# Patient Record
Sex: FEMALE | Race: WHITE | Employment: OTHER | URBAN - METROPOLITAN AREA
[De-identification: names, ages, dates, MRNs, and addresses within clinical notes are randomized per-mention and may not be internally consistent; named-entity substitution may affect disease eponyms.]

---

## 2017-03-27 ENCOUNTER — NURSE NAVIGATOR (OUTPATIENT)
Dept: FAMILY MEDICINE CLINIC | Age: 77
End: 2017-03-27

## 2017-03-27 NOTE — PROGRESS NOTES
Attempted to contact patient in efforts to schedule a Medicare Wellness appointment. Unable to reach patient by phone, However I was able to leave a voice message advising to call the office.

## 2022-08-16 ENCOUNTER — LAB ONLY (OUTPATIENT)
Dept: FAMILY MEDICINE CLINIC | Age: 82
End: 2022-08-16

## 2022-08-16 ENCOUNTER — OFFICE VISIT (OUTPATIENT)
Dept: FAMILY MEDICINE CLINIC | Age: 82
End: 2022-08-16
Payer: COMMERCIAL

## 2022-08-16 VITALS
OXYGEN SATURATION: 98 % | HEIGHT: 61 IN | BODY MASS INDEX: 18.31 KG/M2 | WEIGHT: 97 LBS | RESPIRATION RATE: 16 BRPM | TEMPERATURE: 97.8 F | SYSTOLIC BLOOD PRESSURE: 146 MMHG | DIASTOLIC BLOOD PRESSURE: 80 MMHG | HEART RATE: 85 BPM

## 2022-08-16 DIAGNOSIS — M19.90 INFLAMMATORY ARTHRITIS: ICD-10-CM

## 2022-08-16 DIAGNOSIS — M62.81 MUSCLE WEAKNESS: ICD-10-CM

## 2022-08-16 DIAGNOSIS — I95.1 POSTURAL HYPOTENSION: ICD-10-CM

## 2022-08-16 DIAGNOSIS — M62.84 SARCOPENIA: ICD-10-CM

## 2022-08-16 DIAGNOSIS — M25.512 PAIN OF BOTH SHOULDER JOINTS: Primary | ICD-10-CM

## 2022-08-16 DIAGNOSIS — M25.511 PAIN OF BOTH SHOULDER JOINTS: Primary | ICD-10-CM

## 2022-08-16 PROCEDURE — 1123F ACP DISCUSS/DSCN MKR DOCD: CPT | Performed by: FAMILY MEDICINE

## 2022-08-16 PROCEDURE — 99203 OFFICE O/P NEW LOW 30 MIN: CPT | Performed by: FAMILY MEDICINE

## 2022-08-16 RX ORDER — AMLODIPINE BESYLATE 2.5 MG/1
TABLET ORAL
COMMUNITY
Start: 2022-07-26 | End: 2022-09-08 | Stop reason: SDUPTHER

## 2022-08-16 NOTE — PROGRESS NOTES
Identified pt with two pt identifiers(name and ). Chief Complaint   Patient presents with    New Patient        Health Maintenance Due   Topic    Depression Screen     COVID-19 Vaccine (1)    Shingrix Vaccine Age 50> (1 of 2)    Bone Densitometry (Dexa) Screening     Pneumococcal 65+ years (1 - PCV)    Medicare Yearly Exam        Wt Readings from Last 3 Encounters:   22 97 lb (44 kg)   10/18/16 97 lb (44 kg)   16 97 lb (44 kg)     Temp Readings from Last 3 Encounters:   22 97.8 °F (36.6 °C) (Temporal)   10/18/16 98 °F (36.7 °C) (Oral)   09/15/16 96.6 °F (35.9 °C) (Oral)     BP Readings from Last 3 Encounters:   22 (!) 146/80   10/18/16 152/76   09/15/16 162/81     Pulse Readings from Last 3 Encounters:   22 85   10/18/16 70   09/15/16 65         Learning Assessment:  :     No flowsheet data found. Depression Screening:  :     3 most recent PHQ Screens 2022   Little interest or pleasure in doing things Not at all   Feeling down, depressed, irritable, or hopeless Not at all   Total Score PHQ 2 0       Fall Risk Assessment:  :     No flowsheet data found. Abuse Screening:  :     Abuse Screening Questionnaire 2022   Do you ever feel afraid of your partner? N   Are you in a relationship with someone who physically or mentally threatens you? N   Is it safe for you to go home? Y       Coordination of Care Questionnaire:  :     1) Have you been to an emergency room, urgent care clinic since your last visit? yes [facility] for HTN  Hospitalized since your last visit? no             2) Have you seen or consulted any other health care providers outside of [address] [place] [address] since your last visit? no  (Include any pap smears or colon screenings in this section.)    3) Do you have an Advance Directive on file?  yes  Are you interested in receiving information about Advance Directives? no    Patient is accompanied by  I have received verbal consent from Delilah Francisco to discuss any/all medical information while they are present in the room. 4.  For patients aged 39-70: Has the patient had a colonoscopy / FIT/ Cologuard? NA - based on age      If the patient is female:    11. For patients aged 41-77: Has the patient had a mammogram within the past 2 years? NA - based on age or sex      10. For patients aged 21-65: Has the patient had a pap smear?  NA - based on age or sex

## 2022-08-17 LAB
ALBUMIN SERPL-MCNC: 3.8 G/DL (ref 3.5–5)
ALBUMIN/GLOB SERPL: 1.1 {RATIO} (ref 1.1–2.2)
ALP SERPL-CCNC: 93 U/L (ref 45–117)
ALT SERPL-CCNC: 18 U/L (ref 12–78)
ANION GAP SERPL CALC-SCNC: 8 MMOL/L (ref 5–15)
AST SERPL-CCNC: 21 U/L (ref 15–37)
BILIRUB SERPL-MCNC: 0.3 MG/DL (ref 0.2–1)
BUN SERPL-MCNC: 25 MG/DL (ref 6–20)
BUN/CREAT SERPL: 28 (ref 12–20)
CALCIUM SERPL-MCNC: 9.7 MG/DL (ref 8.5–10.1)
CHLORIDE SERPL-SCNC: 102 MMOL/L (ref 97–108)
CK SERPL-CCNC: 63 U/L (ref 26–192)
CO2 SERPL-SCNC: 31 MMOL/L (ref 21–32)
CREAT SERPL-MCNC: 0.88 MG/DL (ref 0.55–1.02)
ERYTHROCYTE [SEDIMENTATION RATE] IN BLOOD: 44 MM/HR (ref 0–30)
GLOBULIN SER CALC-MCNC: 3.4 G/DL (ref 2–4)
GLUCOSE SERPL-MCNC: 94 MG/DL (ref 65–100)
POTASSIUM SERPL-SCNC: 3.8 MMOL/L (ref 3.5–5.1)
PROT SERPL-MCNC: 7.2 G/DL (ref 6.4–8.2)
SODIUM SERPL-SCNC: 141 MMOL/L (ref 136–145)

## 2022-08-18 RX ORDER — PREDNISONE 10 MG/1
10 TABLET ORAL SEE ADMIN INSTRUCTIONS
Qty: 21 TABLET | Refills: 0 | Status: SHIPPED | OUTPATIENT
Start: 2022-08-18 | End: 2022-10-25

## 2022-08-18 NOTE — PROGRESS NOTES
Eliceo Reeves (: 1940) is a 81 y.o. female, new patient, here for evaluation of the following chief complaint(s):  New Patient and Referral Request (Pt wants a rheumatologist referral/ )       ASSESSMENT/PLAN:  Below is the assessment and plan developed based on review of pertinent history, physical exam, labs, studies, and medications. 1. Pain of both shoulder joints  2. Muscle weakness  -     CK; Future  -     METABOLIC PANEL, COMPREHENSIVE; Future  -     SED RATE (ESR); Future  3. Sarcopenia  -     CK; Future  -     SED RATE (ESR); Future  4. Postural hypotension  5. Inflammatory arthritis  -     SED RATE (ESR); Future  -     predniSONE (STERAPRED DS) 10 mg dose pack; Take 1 Tablet by mouth See Admin Instructions. See administration instruction per 10mg dose pack, Normal, Disp-21 Tablet, R-0      No follow-ups on file. Eliceo Reeves is a 81 y.o. female is an elderly female who presents for ER follow up and to establish care. Patient had fallen, was found to abril having orthostatic hypotension, labs and EKG were normal. Patient also presents for bilateral shoulder pain, with muscle weakness. Advised  to monitor the patients blood pressure. Since it is on the lower side, they should stop using amlodipine. Additionally, the patient should start taking prednisone dosage pack, for shoulder pain and increased ESR. SUBJECTIVE/OBJECTIVE:  Eliceo Reeves is a 81 y.o. female was seen in the hospital for falling.  states that she got up and slowly fell straight down. Had gone to the ER, EKG, blood work all came back normal. Takes amlodipine 2.5 mg daily for blood pressure. Has expressive aphasia and has been diagnosed with dementia in the past.  states that patient also has swelling of the fingers at times, with upper arm and should pain. States that she feels very weak, and complains a lot about shoulder joint and muscle pain.        Review of Systems   Constitutional:  Positive for fatigue. Negative for activity change, appetite change and fever. HENT: Negative. Eyes: Negative. Respiratory: Negative. Endocrine: Negative. Genitourinary: Negative. Musculoskeletal:  Positive for arthralgias and myalgias. Negative for back pain, gait problem, joint swelling, neck pain and neck stiffness. Skin: Negative. Allergic/Immunologic: Negative. Neurological: Negative. Hematological: Negative. Psychiatric/Behavioral:  Positive for confusion and decreased concentration. Negative for sleep disturbance. The patient is not nervous/anxious. Physical Exam  Constitutional:       Appearance: Normal appearance. She is normal weight. HENT:      Head: Normocephalic and atraumatic. Right Ear: Tympanic membrane, ear canal and external ear normal.      Left Ear: Tympanic membrane, ear canal and external ear normal.      Nose: Nose normal.      Mouth/Throat:      Mouth: Mucous membranes are dry. Pharynx: Oropharynx is clear. Eyes:      Extraocular Movements: Extraocular movements intact. Conjunctiva/sclera: Conjunctivae normal.      Pupils: Pupils are equal, round, and reactive to light. Cardiovascular:      Rate and Rhythm: Normal rate and regular rhythm. Pulses: Normal pulses. Heart sounds: Normal heart sounds. Pulmonary:      Effort: Pulmonary effort is normal.      Breath sounds: Normal breath sounds. Abdominal:      General: Abdomen is flat. Palpations: Abdomen is soft. Musculoskeletal:      Right shoulder: Tenderness and bony tenderness present. No deformity or effusion. Decreased range of motion. Decreased strength. Left shoulder: Tenderness and bony tenderness present. No deformity or effusion. Decreased range of motion. Decreased strength. Skin:     General: Skin is warm and dry. Capillary Refill: Capillary refill takes less than 2 seconds. Neurological:      Mental Status: She is alert. Mental status is at baseline. Motor: Weakness present. On this date 08/16/2022 I have spent 20 minutes reviewing previous notes, test results and face to face with the patient discussing the diagnosis and importance of compliance with the treatment plan as well as documenting on the day of the visit. An electronic signature was used to authenticate this note.   -- Trupti Jeffries MD

## 2022-08-25 ENCOUNTER — TELEPHONE (OUTPATIENT)
Dept: FAMILY MEDICINE CLINIC | Age: 82
End: 2022-08-25

## 2022-08-25 NOTE — TELEPHONE ENCOUNTER
Pt's  is calling about medication amlodipine and wanted to know if she needs a refill of the medication.   Pt has completed the prednisone and reduced the pain but pt is still having pain in the right shoulder and wants to know what to do about that    Call pt's  Hareshmalcom Marleni at [phone]

## 2022-08-25 NOTE — TELEPHONE ENCOUNTER
Called patient  back and he stated that patients blood pressure  has been in the 160's to 180's and wasn't sure if he should stop amlodipine. I spoke with Dr. Aiyana Keen who stated that if patients systolic is above 104 to give patient amlodipine. They need to follow up in 2 weeks. Transferred up front to make appointment.

## 2022-09-08 ENCOUNTER — OFFICE VISIT (OUTPATIENT)
Dept: FAMILY MEDICINE CLINIC | Age: 82
End: 2022-09-08
Payer: COMMERCIAL

## 2022-09-08 VITALS
WEIGHT: 95 LBS | OXYGEN SATURATION: 98 % | RESPIRATION RATE: 20 BRPM | SYSTOLIC BLOOD PRESSURE: 180 MMHG | BODY MASS INDEX: 17.94 KG/M2 | DIASTOLIC BLOOD PRESSURE: 90 MMHG | HEIGHT: 61 IN | HEART RATE: 83 BPM | TEMPERATURE: 98.6 F

## 2022-09-08 DIAGNOSIS — M62.84 SARCOPENIA: ICD-10-CM

## 2022-09-08 DIAGNOSIS — I95.1 POSTURAL HYPOTENSION: ICD-10-CM

## 2022-09-08 PROCEDURE — 1123F ACP DISCUSS/DSCN MKR DOCD: CPT | Performed by: FAMILY MEDICINE

## 2022-09-08 PROCEDURE — 99213 OFFICE O/P EST LOW 20 MIN: CPT | Performed by: FAMILY MEDICINE

## 2022-09-08 RX ORDER — TRAMADOL HYDROCHLORIDE 50 MG/1
50 TABLET ORAL
Qty: 6 TABLET | Refills: 0 | Status: SHIPPED | OUTPATIENT
Start: 2022-09-08 | End: 2022-09-22

## 2022-09-08 RX ORDER — AMLODIPINE BESYLATE 2.5 MG/1
2.5 TABLET ORAL DAILY
Qty: 120 TABLET | Refills: 0 | Status: SHIPPED | OUTPATIENT
Start: 2022-09-08 | End: 2022-10-25

## 2022-09-08 NOTE — PATIENT INSTRUCTIONS
Please take tylenol - 2 tablets three times a day  Take tramadol at night before bed time three nights a week for the next 7 weeks  Start taking amlodipine 2.5 mg daily for the next week, then continue to take your blood pressure

## 2022-09-08 NOTE — PROGRESS NOTES
Identified pt with two pt identifiers(name and ). Chief Complaint   Patient presents with    Follow-up     2 week follow-up for shoulder pain    Medication Refill        Health Maintenance Due   Topic    COVID-19 Vaccine (1)    Shingrix Vaccine Age 50> (1 of 2)    Bone Densitometry (Dexa) Screening     Pneumococcal 65+ years (1 - PCV)    Medicare Yearly Exam     Flu Vaccine (1)       Wt Readings from Last 3 Encounters:   22 95 lb (43.1 kg)   22 97 lb (44 kg)   10/18/16 97 lb (44 kg)     Temp Readings from Last 3 Encounters:   22 98.6 °F (37 °C) (Temporal)   22 97.8 °F (36.6 °C) (Temporal)   10/18/16 98 °F (36.7 °C) (Oral)     BP Readings from Last 3 Encounters:   22 (!) 180/90   22 (!) 146/80   10/18/16 152/76     Pulse Readings from Last 3 Encounters:   22 83   22 85   10/18/16 70         Learning Assessment:  :     No flowsheet data found. Depression Screening:  :     3 most recent PHQ Screens 2022   Little interest or pleasure in doing things Not at all   Feeling down, depressed, irritable, or hopeless Not at all   Total Score PHQ 2 0       Fall Risk Assessment:  :     No flowsheet data found. Abuse Screening:  :     Abuse Screening Questionnaire 2022   Do you ever feel afraid of your partner? N N   Are you in a relationship with someone who physically or mentally threatens you? N N   Is it safe for you to go home? Y Y       Coordination of Care Questionnaire:  :     1) Have you been to an emergency room, urgent care clinic since your last visit? no   Hospitalized since your last visit? no             2) Have you seen or consulted any other health care providers outside of [address] [place] [address] since your last visit? no  (Include any pap smears or colon screenings in this section.)    3) Do you have an Advance Directive on file?  yes  Are you interested in receiving information about Advance Directives? no    Patient is accompanied by spouse I have received verbal consent from [facility] to discuss any/all medical information while they are present in the room. 4.  For patients aged 39-70: Has the patient had a colonoscopy / FIT/ Cologuard? NA - based on age      If the patient is female:    11. For patients aged 41-77: Has the patient had a mammogram within the past 2 years? NA - based on age or sex      10. For patients aged 21-65: Has the patient had a pap smear?  NA - based on age or sex

## 2022-09-11 NOTE — PROGRESS NOTES
Krysten Telles (: 1940) is a 81 y.o. female, established patient, here for evaluation of the following chief complaint(s):  Follow-up (2 week follow-up for shoulder pain) and Medication Refill       ASSESSMENT/PLAN:  Below is the assessment and plan developed based on review of pertinent history, physical exam, labs, studies, and medications. 1. Sarcopenia  -     amLODIPine (NORVASC) 2.5 mg tablet; Take 1 Tablet by mouth daily. , Normal, Disp-120 Tablet, R-0  -     traMADoL (ULTRAM) 50 mg tablet; Take 1 Tablet by mouth nightly as needed for Pain for up to 6 doses. Max Daily Amount: 50 mg. Indications: disorder characterized by stiff, tender & painful muscles, Normal, Disp-6 Tablet, R-0Take 1 tablet by mouth for three consecutive nights a week  2. Postural hypotension  -     amLODIPine (NORVASC) 2.5 mg tablet; Take 1 Tablet by mouth daily. , Normal, Disp-120 Tablet, R-0  -     traMADoL (ULTRAM) 50 mg tablet; Take 1 Tablet by mouth nightly as needed for Pain for up to 6 doses. Max Daily Amount: 50 mg. Indications: disorder characterized by stiff, tender & painful muscles, Normal, Disp-6 Tablet, R-0Take 1 tablet by mouth for three consecutive nights a week      No follow-ups on file. SUBJECTIVE/OBJECTIVE:  Patient presents for a follow up, symptoms are still present, not as significant. Review of Systems   Constitutional: Negative. HENT: Negative. Eyes: Negative. Respiratory: Negative. Cardiovascular: Negative. Gastrointestinal: Negative. Endocrine: Negative. Genitourinary: Negative. Musculoskeletal: Negative. Allergic/Immunologic: Negative. Neurological: Negative. Hematological: Negative. Physical Exam          An electronic signature was used to authenticate this note.   -- Can Rodriguez MD

## 2022-09-13 ENCOUNTER — TELEPHONE (OUTPATIENT)
Dept: FAMILY MEDICINE CLINIC | Age: 82
End: 2022-09-13

## 2022-09-13 NOTE — TELEPHONE ENCOUNTER
Pts  called to say the pain in her shoulder has not improved with the medication she was prescribed.  He was wondering if there was anything else they can do.    [phone]

## 2022-09-14 ENCOUNTER — TELEPHONE (OUTPATIENT)
Dept: FAMILY MEDICINE CLINIC | Age: 82
End: 2022-09-14

## 2022-09-14 DIAGNOSIS — M33.90 DERMATOMYOSITIS (HCC): Primary | ICD-10-CM

## 2022-09-14 RX ORDER — METHOTREXATE 2.5 MG/1
2.5 TABLET ORAL
Qty: 4 TABLET | Refills: 0 | Status: SHIPPED | OUTPATIENT
Start: 2022-09-19 | End: 2022-10-19

## 2022-09-14 NOTE — TELEPHONE ENCOUNTER
9/14/2022  11:19 AM      1. [facility]  Plan:  - methotrexate (RHEUMATREX) 2.5 mg tablet; Take 1 Tablet by mouth every Monday for 30 days. Dispense: 4 Tablet; Refill: 0      Imani Vasquez MD     No future appointments.

## 2022-09-14 NOTE — TELEPHONE ENCOUNTER
Patients   Choco Branch is calling in regards to wife and shoulder pain not getting better, the medication is not helping any. please call  today, he stated he sent a message yesterday and has not heard back from anybody.  Advised Dr. Jennifer Morrow was in clinic but would have a nurse yehuda back today    Please Advise;

## 2022-10-25 ENCOUNTER — OFFICE VISIT (OUTPATIENT)
Dept: FAMILY MEDICINE CLINIC | Age: 82
End: 2022-10-25
Payer: COMMERCIAL

## 2022-10-25 VITALS
OXYGEN SATURATION: 98 % | SYSTOLIC BLOOD PRESSURE: 143 MMHG | TEMPERATURE: 97.2 F | RESPIRATION RATE: 19 BRPM | HEART RATE: 77 BPM | DIASTOLIC BLOOD PRESSURE: 82 MMHG | BODY MASS INDEX: 18.54 KG/M2 | WEIGHT: 98.2 LBS | HEIGHT: 61 IN

## 2022-10-25 DIAGNOSIS — M19.90 ARTHRITIS: Primary | ICD-10-CM

## 2022-10-25 PROCEDURE — 99213 OFFICE O/P EST LOW 20 MIN: CPT | Performed by: FAMILY MEDICINE

## 2022-10-25 PROCEDURE — 1123F ACP DISCUSS/DSCN MKR DOCD: CPT | Performed by: FAMILY MEDICINE

## 2022-10-25 RX ORDER — MELOXICAM 7.5 MG/1
7.5 TABLET ORAL DAILY
Qty: 30 TABLET | Refills: 0 | Status: SHIPPED | OUTPATIENT
Start: 2022-10-25 | End: 2022-11-16

## 2022-10-25 NOTE — PROGRESS NOTES
Identified pt with two pt identifiers(name and ). Chief Complaint   Patient presents with    Hand Swelling     Both hands are swelling, which started after starting medication, finished medication 10/17 but hands are still swelling     Follow-up     Medication follow up, medication did not help much with pain in shoulders         Health Maintenance Due   Topic    COVID-19 Vaccine (1)    Shingrix Vaccine Age 50> (1 of 2)    Bone Densitometry (Dexa) Screening     Pneumococcal 65+ years (1 - PCV)    Flu Vaccine (1)    Medicare Yearly Exam        Wt Readings from Last 3 Encounters:   22 95 lb (43.1 kg)   22 97 lb (44 kg)   10/18/16 97 lb (44 kg)     Temp Readings from Last 3 Encounters:   22 98.6 °F (37 °C) (Temporal)   22 97.8 °F (36.6 °C) (Temporal)   10/18/16 98 °F (36.7 °C) (Oral)     BP Readings from Last 3 Encounters:   22 (!) 180/90   22 (!) 146/80   10/18/16 152/76     Pulse Readings from Last 3 Encounters:   22 83   22 85   10/18/16 70         Learning Assessment:  :     No flowsheet data found. Depression Screening:  :     3 most recent PHQ Screens 2022   Little interest or pleasure in doing things Not at all   Feeling down, depressed, irritable, or hopeless Not at all   Total Score PHQ 2 0       Fall Risk Assessment:  :     No flowsheet data found. Abuse Screening:  :     Abuse Screening Questionnaire 2022   Do you ever feel afraid of your partner? N N   Are you in a relationship with someone who physically or mentally threatens you? N N   Is it safe for you to go home?  Y Y       Coordination of Care Questionnaire:  :     1) Have you been to an emergency room, urgent care clinic since your last visit? no   Hospitalized since your last visit? no             2) Have you seen or consulted any other health care providers outside of [address] [place] [address] since your last visit? no  (Include any pap smears or colon screenings in this section.)    3) Do you have an Advance Directive on file? yes  Are you interested in receiving information about Advance Directives? no    Patient is accompanied by  I have received verbal consent from Hardeep Arnold to discuss any/all medical information while they are present in the room. 4.  For patients aged 39-70: Has the patient had a colonoscopy / FIT/ Cologuard? NA - based on age      If the patient is female:    11. For patients aged 41-77: Has the patient had a mammogram within the past 2 years? NA - based on age or sex      10. For patients aged 21-65: Has the patient had a pap smear?  NA - based on age or sex

## 2022-10-26 NOTE — PROGRESS NOTES
Alpa Gottlieb (: 1940) is a 81 y.o. female, established patient, here for evaluation of the following chief complaint(s):  Hand Swelling (Both hands are swelling, which started after starting medication, finished medication 10/17 but hands are still swelling ), Follow-up (Medication follow up, medication did not help much with pain in shoulders ), and Shoulder Pain (Shoulder pain in both shoulders and arms )       ASSESSMENT/PLAN:  Below is the assessment and plan developed based on review of pertinent history, physical exam, labs, studies, and medications. 1. Arthritis  -     meloxicam (MOBIC) 7.5 mg tablet; Take 1 Tablet by mouth daily. Indications: rheumatoid arthritis, Normal, Disp-30 Tablet, R-0  -     REFERRAL TO RHEUMATOLOGY      No follow-ups on file. SUBJECTIVE/OBJECTIVE:  Patient presents for a follow up, symptoms are still present, not as significant. Review of Systems   Constitutional: Negative. HENT: Negative. Eyes: Negative. Respiratory: Negative. Cardiovascular: Negative. Gastrointestinal: Negative. Endocrine: Negative. Genitourinary: Negative. Musculoskeletal: Negative. Allergic/Immunologic: Negative. Neurological: Negative. Hematological: Negative. Physical Exam  Musculoskeletal:      Right hand: Swelling, deformity and bony tenderness present. Left hand: Swelling, deformity and bony tenderness present. An electronic signature was used to authenticate this note.   -- Julia Boyle MD

## 2022-11-10 ENCOUNTER — TELEPHONE (OUTPATIENT)
Dept: FAMILY MEDICINE CLINIC | Age: 82
End: 2022-11-10

## 2022-11-10 NOTE — TELEPHONE ENCOUNTER
Pt husbands has questions about the medication that was prescribe to her.  Please call back when available    [phone]

## 2022-11-10 NOTE — TELEPHONE ENCOUNTER
Patients  called and stated meloxicam has made a really good impact with patients rheumatoid arthritis so they were going to wait on going to rheumatology but said sometimes towards the evening she will get achy depending on what she does throughout the day and wanted  to know if it was okay to take a second 7.5 in the evening on the days that are worse?

## 2022-11-16 DIAGNOSIS — M19.90 ARTHRITIS: ICD-10-CM

## 2022-11-16 RX ORDER — MELOXICAM 7.5 MG/1
TABLET ORAL
Qty: 30 TABLET | Refills: 0 | Status: SHIPPED | OUTPATIENT
Start: 2022-11-16

## 2022-12-12 DIAGNOSIS — M19.90 ARTHRITIS: ICD-10-CM

## 2022-12-17 RX ORDER — MELOXICAM 7.5 MG/1
TABLET ORAL
Qty: 30 TABLET | Refills: 0 | Status: SHIPPED | OUTPATIENT
Start: 2022-12-17

## 2023-01-17 ENCOUNTER — TELEPHONE (OUTPATIENT)
Dept: FAMILY MEDICINE CLINIC | Age: 83
End: 2023-01-17

## 2023-01-17 DIAGNOSIS — M06.09 RHEUMATOID ARTHRITIS OF MULTIPLE SITES WITH NEGATIVE RHEUMATOID FACTOR (HCC): ICD-10-CM

## 2023-01-17 DIAGNOSIS — M15.9 PRIMARY OSTEOARTHRITIS INVOLVING MULTIPLE JOINTS: Primary | ICD-10-CM

## 2023-01-17 RX ORDER — MELOXICAM 7.5 MG/1
7.5 TABLET ORAL DAILY
Qty: 120 TABLET | Refills: 0 | Status: SHIPPED | OUTPATIENT
Start: 2023-01-17 | End: 2023-01-17

## 2023-01-17 RX ORDER — MELOXICAM 7.5 MG/1
7.5 TABLET ORAL DAILY
Qty: 120 TABLET | Refills: 0 | Status: SHIPPED | OUTPATIENT
Start: 2023-01-17

## 2023-01-17 NOTE — TELEPHONE ENCOUNTER
----- Message from Chung sent at 1/17/2023  8:54 AM EST -----  Regarding: FW: Meloxicam    ----- Message -----  From: Richy Potts  Sent: 1/16/2023   7:03 PM EST  To: [facility] Nurse Pool  Subject: Meloxicam                                        [facility] sending request for approval. Please (oh please) consider:  1. send RX for 90 day supply. We are old and don't like to drive in to [place] any more than we have to.    2. Authorize several refills so that we don't have to go through this approval process every time. Thank you very much for your consideration.   Maggie Henry for [employer]

## 2023-01-17 NOTE — TELEPHONE ENCOUNTER
1/17/2023  9:02 AM    1. Primary osteoarthritis involving multiple joints  Plan:  - meloxicam (MOBIC) 7.5 mg tablet; Take 1 Tablet by mouth daily. Indications: joint damage causing pain and loss of function, M15.9, M06.9  Dispense: 120 Tablet; Refill: 0    2. Rheumatoid arthritis of multiple sites with negative rheumatoid factor (HCC)  Plan:  - meloxicam (MOBIC) 7.5 mg tablet; Take 1 Tablet by mouth daily. Indications: joint damage causing pain and loss of function, M15.9, M06.9  Dispense: 120 Tablet; Refill: 0    Dre Fishman MD   No future appointments.

## 2023-05-11 RX ORDER — MELOXICAM 7.5 MG/1
TABLET ORAL
Qty: 120 TABLET | Refills: 0 | Status: SHIPPED | OUTPATIENT
Start: 2023-05-11

## 2023-05-15 RX ORDER — MELOXICAM 7.5 MG/1
7.5 TABLET ORAL DAILY
Qty: 30 TABLET | Refills: 5 | Status: SHIPPED | OUTPATIENT
Start: 2023-05-15

## 2023-05-15 NOTE — TELEPHONE ENCOUNTER
----- Message from Sammi Payne sent at 5/15/2023  9:52 AM EDT -----  Regarding: meloxicam  Contact: [phone]  Our RX has  although I don't know why. You authorized 3 refills of 90. [facility] supplied refills of 30 claiming that the insurance limited it. I still give [place] 1 meloxicam each day. Please expedite approval as Tuesday is our [facility] day. Thank you.   Riana Bae

## 2023-09-07 NOTE — TELEPHONE ENCOUNTER
----- Message from Cristel Dudley sent at 2023  1:48 PM EDT -----  Regarding: meloxicam  Contact: [phone]  [facility] is submitting a request for refill (meloxicam) since RX has . We would greatly appreciate a new RX for 90 tablets (90 days) as this would carry us to our November appt with you. [We try top limit our trips as much as possible. Thank you for your consideration.   [facility]

## 2023-09-08 RX ORDER — MELOXICAM 7.5 MG/1
7.5 TABLET ORAL DAILY
Qty: 90 TABLET | Refills: 1 | Status: SHIPPED | OUTPATIENT
Start: 2023-09-08

## 2023-09-27 RX ORDER — AMLODIPINE BESYLATE 2.5 MG/1
2.5 TABLET ORAL DAILY
Qty: 120 TABLET | Refills: 0 | Status: SHIPPED | OUTPATIENT
Start: 2023-09-27

## 2023-11-15 ENCOUNTER — OFFICE VISIT (OUTPATIENT)
Age: 83
End: 2023-11-15
Payer: MEDICARE

## 2023-11-15 VITALS
HEART RATE: 86 BPM | SYSTOLIC BLOOD PRESSURE: 143 MMHG | RESPIRATION RATE: 17 BRPM | DIASTOLIC BLOOD PRESSURE: 80 MMHG | OXYGEN SATURATION: 99 % | BODY MASS INDEX: 20.09 KG/M2 | TEMPERATURE: 98.2 F | WEIGHT: 106.4 LBS | HEIGHT: 61 IN

## 2023-11-15 DIAGNOSIS — R47.01 EXPRESSIVE APHASIA: ICD-10-CM

## 2023-11-15 DIAGNOSIS — R30.0 DYSURIA: ICD-10-CM

## 2023-11-15 DIAGNOSIS — Z00.00 MEDICARE ANNUAL WELLNESS VISIT, INITIAL: ICD-10-CM

## 2023-11-15 DIAGNOSIS — Z00.00 MEDICARE ANNUAL WELLNESS VISIT, SUBSEQUENT: Primary | ICD-10-CM

## 2023-11-15 LAB
BILIRUBIN, URINE, POC: NEGATIVE
BLOOD URINE, POC: NORMAL
GLUCOSE URINE, POC: NEGATIVE
KETONES, URINE, POC: NEGATIVE
LEUKOCYTE ESTERASE, URINE, POC: NEGATIVE
NITRITE, URINE, POC: NEGATIVE
PH, URINE, POC: 5 (ref 4.6–8)
PROTEIN,URINE, POC: NORMAL
SPECIFIC GRAVITY, URINE, POC: 1.03 (ref 1–1.03)
URINALYSIS CLARITY, POC: CLEAR
URINALYSIS COLOR, POC: YELLOW
UROBILINOGEN, POC: NORMAL

## 2023-11-15 PROCEDURE — 99213 OFFICE O/P EST LOW 20 MIN: CPT | Performed by: FAMILY MEDICINE

## 2023-11-15 PROCEDURE — 81001 URINALYSIS AUTO W/SCOPE: CPT | Performed by: FAMILY MEDICINE

## 2023-11-15 SDOH — ECONOMIC STABILITY: FOOD INSECURITY: WITHIN THE PAST 12 MONTHS, THE FOOD YOU BOUGHT JUST DIDN'T LAST AND YOU DIDN'T HAVE MONEY TO GET MORE.: NEVER TRUE

## 2023-11-15 SDOH — ECONOMIC STABILITY: HOUSING INSECURITY
IN THE LAST 12 MONTHS, WAS THERE A TIME WHEN YOU DID NOT HAVE A STEADY PLACE TO SLEEP OR SLEPT IN A SHELTER (INCLUDING NOW)?: NO

## 2023-11-15 SDOH — ECONOMIC STABILITY: FOOD INSECURITY: WITHIN THE PAST 12 MONTHS, YOU WORRIED THAT YOUR FOOD WOULD RUN OUT BEFORE YOU GOT MONEY TO BUY MORE.: NEVER TRUE

## 2023-11-15 SDOH — ECONOMIC STABILITY: INCOME INSECURITY: HOW HARD IS IT FOR YOU TO PAY FOR THE VERY BASICS LIKE FOOD, HOUSING, MEDICAL CARE, AND HEATING?: NOT HARD AT ALL

## 2023-11-15 ASSESSMENT — PATIENT HEALTH QUESTIONNAIRE - PHQ9
SUM OF ALL RESPONSES TO PHQ QUESTIONS 1-9: 0
SUM OF ALL RESPONSES TO PHQ QUESTIONS 1-9: 0
1. LITTLE INTEREST OR PLEASURE IN DOING THINGS: 0
SUM OF ALL RESPONSES TO PHQ9 QUESTIONS 1 & 2: 0
SUM OF ALL RESPONSES TO PHQ QUESTIONS 1-9: 0
2. FEELING DOWN, DEPRESSED OR HOPELESS: 0
SUM OF ALL RESPONSES TO PHQ QUESTIONS 1-9: 0

## 2023-11-15 NOTE — PROGRESS NOTES
Medicare Annual Wellness Visit    Sonja Garnica is here for Medicare AWV (Patient is here for medicare wellness ) and Blood Pressure Check (Patients  takes her BP every morning and would like to discuss )    Assessment & Plan   Expressive aphasia    Recommendations for Preventive Services Due: see orders and patient instructions/AVS.  Recommended screening schedule for the next 5-10 years is provided to the patient in written form: see Patient Instructions/AVS.     No follow-ups on file. Subjective   The following acute and/or chronic problems were also addressed today:  Pressure in the bladder area,     Patient's complete Health Risk Assessment and screening values have been reviewed and are found in Flowsheets. The following problems were reviewed today and where indicated follow up appointments were made and/or referrals ordered. Positive Risk Factor Screenings with Interventions:    Fall Risk:  Do you feel unsteady or are you worried about falling? : (!) yes  2 or more falls in past year?: (!) yes  Fall with injury in past year?: no     Interventions:    Patient comments: Patients  states that she has had falls, describes them as her just losing tone in her body, and collapsing to the ground. She has been seen in the ER for the same on several occasions and has been counseled about hydrating and autonomic symptoms. We discusses this during the appointment, and have a discussion about their concerns and reservations about using a walker.    See AVS for additional education material  See A/P for plan and any pertinent orders                Hearing Screen:  Do you or your family notice any trouble with your hearing that hasn't been managed with hearing aids?: (!) Yes    Interventions:  See AVS for additional education material     Safety:  Do you have any tripping hazards - loose or unsecured carpets or rugs?: (!) Yes  Do you have either shower bars, grab bars, non-slip mats or non-slip

## 2023-11-16 LAB
APPEARANCE UR: ABNORMAL
BACTERIA URNS QL MICRO: NEGATIVE /HPF
BILIRUB UR QL: NEGATIVE
COLOR UR: ABNORMAL
EPITH CASTS URNS QL MICRO: ABNORMAL /LPF
GLUCOSE UR STRIP.AUTO-MCNC: NEGATIVE MG/DL
HGB UR QL STRIP: ABNORMAL
HYALINE CASTS URNS QL MICRO: ABNORMAL /LPF (ref 0–5)
KETONES UR QL STRIP.AUTO: NEGATIVE MG/DL
LEUKOCYTE ESTERASE UR QL STRIP.AUTO: NEGATIVE
NITRITE UR QL STRIP.AUTO: NEGATIVE
PH UR STRIP: 5.5 (ref 5–8)
PROT UR STRIP-MCNC: NEGATIVE MG/DL
RBC #/AREA URNS HPF: ABNORMAL /HPF (ref 0–5)
SP GR UR REFRACTOMETRY: 1.02 (ref 1–1.03)
URINE CULTURE IF INDICATED: ABNORMAL
UROBILINOGEN UR QL STRIP.AUTO: 0.2 EU/DL (ref 0.2–1)
WBC URNS QL MICRO: ABNORMAL /HPF (ref 0–4)

## 2023-11-22 ENCOUNTER — NURSE ONLY (OUTPATIENT)
Age: 83
End: 2023-11-22

## 2023-11-22 DIAGNOSIS — Z00.00 MEDICARE ANNUAL WELLNESS VISIT, SUBSEQUENT: ICD-10-CM

## 2023-11-22 LAB
ALBUMIN SERPL-MCNC: 3.7 G/DL (ref 3.5–5)
ALBUMIN/GLOB SERPL: 1.2 (ref 1.1–2.2)
ALP SERPL-CCNC: 93 U/L (ref 45–117)
ALT SERPL-CCNC: 16 U/L (ref 12–78)
ANION GAP SERPL CALC-SCNC: 6 MMOL/L (ref 5–15)
AST SERPL-CCNC: 21 U/L (ref 15–37)
BASOPHILS # BLD: 0 K/UL (ref 0–0.1)
BASOPHILS NFR BLD: 0 % (ref 0–1)
BILIRUB SERPL-MCNC: 0.4 MG/DL (ref 0.2–1)
BUN SERPL-MCNC: 28 MG/DL (ref 6–20)
BUN/CREAT SERPL: 33 (ref 12–20)
CALCIUM SERPL-MCNC: 9 MG/DL (ref 8.5–10.1)
CHLORIDE SERPL-SCNC: 110 MMOL/L (ref 97–108)
CO2 SERPL-SCNC: 25 MMOL/L (ref 21–32)
CREAT SERPL-MCNC: 0.85 MG/DL (ref 0.55–1.02)
DIFFERENTIAL METHOD BLD: NORMAL
EOSINOPHIL # BLD: 0.2 K/UL (ref 0–0.4)
EOSINOPHIL NFR BLD: 3 % (ref 0–7)
ERYTHROCYTE [DISTWIDTH] IN BLOOD BY AUTOMATED COUNT: 12.7 % (ref 11.5–14.5)
GLOBULIN SER CALC-MCNC: 3 G/DL (ref 2–4)
GLUCOSE SERPL-MCNC: 114 MG/DL (ref 65–100)
HCT VFR BLD AUTO: 37.8 % (ref 35–47)
HGB BLD-MCNC: 12.7 G/DL (ref 11.5–16)
IMM GRANULOCYTES # BLD AUTO: 0 K/UL (ref 0–0.04)
IMM GRANULOCYTES NFR BLD AUTO: 0 % (ref 0–0.5)
LYMPHOCYTES # BLD: 2.3 K/UL (ref 0.8–3.5)
LYMPHOCYTES NFR BLD: 33 % (ref 12–49)
MCH RBC QN AUTO: 32.5 PG (ref 26–34)
MCHC RBC AUTO-ENTMCNC: 33.6 G/DL (ref 30–36.5)
MCV RBC AUTO: 96.7 FL (ref 80–99)
MONOCYTES # BLD: 0.4 K/UL (ref 0–1)
MONOCYTES NFR BLD: 6 % (ref 5–13)
NEUTS SEG # BLD: 3.9 K/UL (ref 1.8–8)
NEUTS SEG NFR BLD: 58 % (ref 32–75)
NRBC # BLD: 0 K/UL (ref 0–0.01)
NRBC BLD-RTO: 0 PER 100 WBC
PLATELET # BLD AUTO: 241 K/UL (ref 150–400)
PMV BLD AUTO: 9.9 FL (ref 8.9–12.9)
POTASSIUM SERPL-SCNC: 4.2 MMOL/L (ref 3.5–5.1)
PROT SERPL-MCNC: 6.7 G/DL (ref 6.4–8.2)
RBC # BLD AUTO: 3.91 M/UL (ref 3.8–5.2)
SODIUM SERPL-SCNC: 141 MMOL/L (ref 136–145)
WBC # BLD AUTO: 6.8 K/UL (ref 3.6–11)

## 2023-11-23 NOTE — RESULT ENCOUNTER NOTE
Your labs indicate that you continue to have signs of dehydration in the way your kidney is functioning. Please increase your water and fluid intake to at least 48 ounce of water a day.

## 2023-11-24 ENCOUNTER — TELEPHONE (OUTPATIENT)
Age: 83
End: 2023-11-24

## 2023-11-24 LAB — TSH SERPL DL<=0.05 MIU/L-ACNC: 1.62 UIU/ML (ref 0.45–4.5)

## 2023-11-24 NOTE — TELEPHONE ENCOUNTER
----- Message from Darlyn Montiel MD sent at 11/23/2023 11:19 AM EST -----  Your labs indicate that you continue to have signs of dehydration in the way your kidney is functioning. Please increase your water and fluid intake to at least 48 ounce of water a day.

## 2023-12-15 PROBLEM — Z00.00 MEDICARE ANNUAL WELLNESS VISIT, INITIAL: Status: RESOLVED | Noted: 2023-11-15 | Resolved: 2023-12-15

## 2023-12-15 PROBLEM — Z00.00 MEDICARE ANNUAL WELLNESS VISIT, SUBSEQUENT: Status: RESOLVED | Noted: 2023-11-15 | Resolved: 2023-12-15

## 2024-02-06 NOTE — TELEPHONE ENCOUNTER
----- Message from Digna Encarnacion sent at 2/6/2024  2:31 PM EST -----  Regarding: meloxicam  Contact: [phone]  It is time to refill Digna's meloxicam RX. Please change the prescription to a 90 days supply if possible. Thank you for your consideration.

## 2024-02-07 RX ORDER — MELOXICAM 7.5 MG/1
7.5 TABLET ORAL DAILY
Qty: 90 TABLET | Refills: 1 | Status: SHIPPED | OUTPATIENT
Start: 2024-02-07

## 2024-03-21 ENCOUNTER — TELEMEDICINE (OUTPATIENT)
Age: 84
End: 2024-03-21
Payer: MEDICARE

## 2024-03-21 DIAGNOSIS — Z17.0 MALIGNANT NEOPLASM OF LOWER-OUTER QUADRANT OF RIGHT BREAST OF FEMALE, ESTROGEN RECEPTOR POSITIVE (HCC): ICD-10-CM

## 2024-03-21 DIAGNOSIS — Z71.89 ADVANCED DIRECTIVES, COUNSELING/DISCUSSION: ICD-10-CM

## 2024-03-21 DIAGNOSIS — C50.511 MALIGNANT NEOPLASM OF LOWER-OUTER QUADRANT OF RIGHT BREAST OF FEMALE, ESTROGEN RECEPTOR POSITIVE (HCC): ICD-10-CM

## 2024-03-21 DIAGNOSIS — I10 PRIMARY HYPERTENSION: Primary | ICD-10-CM

## 2024-03-21 PROCEDURE — G8428 CUR MEDS NOT DOCUMENT: HCPCS | Performed by: FAMILY MEDICINE

## 2024-03-21 PROCEDURE — G8400 PT W/DXA NO RESULTS DOC: HCPCS | Performed by: FAMILY MEDICINE

## 2024-03-21 PROCEDURE — 4004F PT TOBACCO SCREEN RCVD TLK: CPT | Performed by: FAMILY MEDICINE

## 2024-03-21 PROCEDURE — 99212 OFFICE O/P EST SF 10 MIN: CPT | Performed by: FAMILY MEDICINE

## 2024-03-21 PROCEDURE — 1123F ACP DISCUSS/DSCN MKR DOCD: CPT | Performed by: FAMILY MEDICINE

## 2024-03-21 PROCEDURE — G8420 CALC BMI NORM PARAMETERS: HCPCS | Performed by: FAMILY MEDICINE

## 2024-03-21 PROCEDURE — 1090F PRES/ABSN URINE INCON ASSESS: CPT | Performed by: FAMILY MEDICINE

## 2024-03-21 PROCEDURE — G8484 FLU IMMUNIZE NO ADMIN: HCPCS | Performed by: FAMILY MEDICINE

## 2024-03-25 ASSESSMENT — ENCOUNTER SYMPTOMS
WHEEZING: 0
SHORTNESS OF BREATH: 0
COUGH: 0
CHEST TIGHTNESS: 0

## 2024-03-25 NOTE — PROGRESS NOTES
Digna Encarnacion is a 83 y.o. female evaluated via audio-only technology on 3/21/2024 for No chief complaint on file.  .      Assessment & Plan:   Primary hypertension  Malignant neoplasm of lower-outer quadrant of right breast of female, estrogen receptor positive (HCC)  Advanced directives, counseling/discussion  Does not want excessive measures to be used in an emergency/hospital setting. Has paper work and will be dropping it off to the clinic.   No follow-ups on file.     Subjective:   Digna Encarnacion is a 83 y.o. female whose  is the primary care take, states that his wifes blood pressure has been elevated on several occasions. States that she is compliant on amlodipine 2.5 mg. Advised him that he could increase it to 5 mg to see if that helps with her blood pressure. He denies noting that she is complaining of chest pain or pressure. States that it has been as high as 155-165 systolic over 75-90 diastolic.     Prior to Admission medications    Medication Sig Start Date End Date Taking? Authorizing Provider   meloxicam (MOBIC) 7.5 MG tablet Take 1 tablet by mouth daily 24   Génesis Lutz MD   amLODIPine (NORVASC) 2.5 MG tablet TAKE 1 TABLET BY MOUTH ONE TIME DAILY 23   Génesis Lutz MD     Allergies   Allergen Reactions    Sulfa Antibiotics Hives    Penicillins Hives     Past Medical History:   Diagnosis Date    Autoimmune disease (HCC)     Hypertension      Past Surgical History:   Procedure Laterality Date    BREAST LUMPECTOMY      breast cancer     SECTION      HYSTERECTOMY (CERVIX STATUS UNKNOWN)      for fibroids    TONSILLECTOMY       Social History     Tobacco Use    Smoking status: Never    Smokeless tobacco: Never   Substance Use Topics    Alcohol use: No    Drug use: No     Family History   Problem Relation Age of Onset    Stomach Cancer Maternal Grandmother     Stroke Father     Stroke Mother     Breast Cancer Daughter 45     Immunization History   Administered Date(s)

## 2024-04-25 RX ORDER — AMLODIPINE BESYLATE 2.5 MG/1
2.5 TABLET ORAL DAILY
Qty: 120 TABLET | Refills: 0 | Status: SHIPPED | OUTPATIENT
Start: 2024-04-25

## 2024-05-08 ENCOUNTER — OFFICE VISIT (OUTPATIENT)
Age: 84
End: 2024-05-08
Payer: MEDICARE

## 2024-05-08 DIAGNOSIS — Z00.00 MEDICARE ANNUAL WELLNESS VISIT, SUBSEQUENT: Primary | ICD-10-CM

## 2024-05-08 DIAGNOSIS — R41.89 MODERATE COGNITIVE IMPAIRMENT: ICD-10-CM

## 2024-05-08 DIAGNOSIS — R47.01 EXPRESSIVE APHASIA: ICD-10-CM

## 2024-05-08 PROCEDURE — 1123F ACP DISCUSS/DSCN MKR DOCD: CPT | Performed by: FAMILY MEDICINE

## 2024-05-08 PROCEDURE — G0439 PPPS, SUBSEQ VISIT: HCPCS | Performed by: FAMILY MEDICINE

## 2024-05-08 ASSESSMENT — PATIENT HEALTH QUESTIONNAIRE - PHQ9
5. POOR APPETITE OR OVEREATING: SEVERAL DAYS
SUM OF ALL RESPONSES TO PHQ QUESTIONS 1-9: 1
SUM OF ALL RESPONSES TO PHQ9 QUESTIONS 1 & 2: 0
1. LITTLE INTEREST OR PLEASURE IN DOING THINGS: NOT AT ALL
SUM OF ALL RESPONSES TO PHQ QUESTIONS 1-9: 1
4. FEELING TIRED OR HAVING LITTLE ENERGY: NOT AT ALL
SUM OF ALL RESPONSES TO PHQ QUESTIONS 1-9: 1
6. FEELING BAD ABOUT YOURSELF - OR THAT YOU ARE A FAILURE OR HAVE LET YOURSELF OR YOUR FAMILY DOWN: NOT AT ALL
3. TROUBLE FALLING OR STAYING ASLEEP: NOT AT ALL
SUM OF ALL RESPONSES TO PHQ QUESTIONS 1-9: 1
7. TROUBLE CONCENTRATING ON THINGS, SUCH AS READING THE NEWSPAPER OR WATCHING TELEVISION: NOT AT ALL
10. IF YOU CHECKED OFF ANY PROBLEMS, HOW DIFFICULT HAVE THESE PROBLEMS MADE IT FOR YOU TO DO YOUR WORK, TAKE CARE OF THINGS AT HOME, OR GET ALONG WITH OTHER PEOPLE: NOT DIFFICULT AT ALL
8. MOVING OR SPEAKING SO SLOWLY THAT OTHER PEOPLE COULD HAVE NOTICED. OR THE OPPOSITE, BEING SO FIGETY OR RESTLESS THAT YOU HAVE BEEN MOVING AROUND A LOT MORE THAN USUAL: NOT AT ALL
2. FEELING DOWN, DEPRESSED OR HOPELESS: NOT AT ALL
9. THOUGHTS THAT YOU WOULD BE BETTER OFF DEAD, OR OF HURTING YOURSELF: NOT AT ALL

## 2024-05-08 ASSESSMENT — LIFESTYLE VARIABLES
HOW MANY STANDARD DRINKS CONTAINING ALCOHOL DO YOU HAVE ON A TYPICAL DAY: PATIENT DOES NOT DRINK
HOW OFTEN DO YOU HAVE A DRINK CONTAINING ALCOHOL: NEVER
HOW MANY STANDARD DRINKS CONTAINING ALCOHOL DO YOU HAVE ON A TYPICAL DAY: PATIENT DOES NOT DRINK
HOW OFTEN DO YOU HAVE A DRINK CONTAINING ALCOHOL: NEVER

## 2024-05-08 NOTE — PATIENT INSTRUCTIONS
comprehensive review of your medical history including lifestyle, illnesses that may run in your family, and various assessments and screenings as appropriate.    After reviewing your medical record and screening and assessments performed today your provider may have ordered immunizations, labs, imaging, and/or referrals for you.  A list of these orders (if applicable) as well as your Preventive Care list are included within your After Visit Summary for your review.    Other Preventive Recommendations:    A preventive eye exam performed by an eye specialist is recommended every 1-2 years to screen for glaucoma; cataracts, macular degeneration, and other eye disorders.  A preventive dental visit is recommended every 6 months.  Try to get at least 150 minutes of exercise per week or 10,000 steps per day on a pedometer .  Order or download the FREE \"Exercise & Physical Activity: Your Everyday Guide\" from [employer]. Call 1-175.378.6843 or search [employer] [place] online.  You need 4409-4915 mg of calcium and 0918-7884 IU of vitamin D per day. It is possible to meet your calcium requirement with diet alone, but a vitamin D supplement is usually necessary to meet this goal.  When exposed to the sun, use a sunscreen that protects against both UVA and UVB radiation with an SPF of 30 or greater. Reapply every 2 to 3 hours or after sweating, drying off with a towel, or swimming.  Always wear a seat belt when traveling in a car. Always wear a helmet when riding a bicycle or motorcycle.

## 2024-05-21 ENCOUNTER — TELEPHONE (OUTPATIENT)
Age: 84
End: 2024-05-21

## 2024-05-21 NOTE — TELEPHONE ENCOUNTER
Elsie -child  Mom is having tinnitus- Will Dr Lutz prescribe lorazepam 0.25 . Patient is going through a lot of changes. Paranoia, insomnia. She feels like lorazepam will help these symptoms especially tinnitus. She said she doesn't need to know any information but she wants to update us with what she sees    [phone]

## 2024-05-28 ENCOUNTER — OFFICE VISIT (OUTPATIENT)
Age: 84
End: 2024-05-28
Payer: MEDICARE

## 2024-05-28 VITALS
BODY MASS INDEX: 21.05 KG/M2 | TEMPERATURE: 98 F | SYSTOLIC BLOOD PRESSURE: 142 MMHG | HEIGHT: 60 IN | RESPIRATION RATE: 17 BRPM | OXYGEN SATURATION: 98 % | WEIGHT: 107.2 LBS | HEART RATE: 95 BPM | DIASTOLIC BLOOD PRESSURE: 89 MMHG

## 2024-05-28 DIAGNOSIS — H65.93 FLUID LEVEL BEHIND TYMPANIC MEMBRANE OF BOTH EARS: ICD-10-CM

## 2024-05-28 DIAGNOSIS — J30.1 SEASONAL ALLERGIC RHINITIS DUE TO POLLEN: Primary | ICD-10-CM

## 2024-05-28 DIAGNOSIS — H93.12 TINNITUS OF LEFT EAR: ICD-10-CM

## 2024-05-28 PROCEDURE — 99213 OFFICE O/P EST LOW 20 MIN: CPT | Performed by: FAMILY MEDICINE

## 2024-05-28 RX ORDER — CETIRIZINE HYDROCHLORIDE 10 MG/1
10 TABLET ORAL DAILY
Qty: 30 TABLET | Refills: 0 | Status: SHIPPED | OUTPATIENT
Start: 2024-05-28

## 2024-05-28 NOTE — PROGRESS NOTES
Identified pt with two pt identifiers(name and ).    Chief Complaint   Patient presents with    Tinnitus     Patient would like to discuss tinnitus concerns, patient called in  see notes from telephone encounter         Health Maintenance Due   Topic    COVID-19 Vaccine (1)    DTaP/Tdap/Td vaccine (1 - Tdap)    Shingles vaccine (1 of 2)    DEXA (modify frequency per FRAX score)     Respiratory Syncytial Virus (RSV) Pregnant or age 60 yrs+ (1 - 1-dose 60+ series)    Pneumococcal 65+ years Vaccine (1 of 1 - PCV)       Wt Readings from Last 3 Encounters:   11/15/23 48.3 kg (106 lb 6.4 oz)   10/25/22 44.5 kg (98 lb 3.2 oz)   22 43.1 kg (95 lb)     Temp Readings from Last 3 Encounters:   11/15/23 98.2 °F (36.8 °C) (Temporal)     BP Readings from Last 3 Encounters:   11/15/23 (!) 143/80   10/25/22 (!) 143/82   22 (!) 180/90     Pulse Readings from Last 3 Encounters:   11/15/23 86   10/25/22 77   22 83           Depression Screening:  :         2024     3:46 PM 11/15/2023    11:26 AM 10/25/2022     1:00 PM 2022    10:00 AM 2022     1:00 PM   PHQ-9 Questionaire   Little interest or pleasure in doing things 0 0 0 0 0   Feeling down, depressed, or hopeless 0 0 0 0 0   Trouble falling or staying asleep, or sleeping too much 0       Feeling tired or having little energy 0       Poor appetite or overeating 1       Feeling bad about yourself - or that you are a failure or have let yourself or your family down 0       Trouble concentrating on things, such as reading the newspaper or watching television 0       Moving or speaking so slowly that other people could have noticed. Or the opposite - being so fidgety or restless that you have been moving around a lot more than usual 0       Thoughts that you would be better off dead, or of hurting yourself in some way 0       PHQ-9 Total Score 1 0 0 0 0   If you checked off any problems, how difficult have these problems made it for you to do your

## 2024-05-30 NOTE — PROGRESS NOTES
Assessment & Plan   1. Seasonal allergic rhinitis due to pollen  2. Fluid level behind tympanic membrane of both ears  3. Tinnitus of left ear  -     cetirizine (ZYRTEC) 10 MG tablet; Take 1 tablet by mouth daily, Disp-30 tablet, R-0Normal     Patients symptoms are consistent with ringing in the ear. Complains ringing that is louder in one ear.     Discussed CT head with and without contrast.  and patient do not want to image further.   No follow-ups on file.       Subjective   Digna Encarnacion (:  1940) is a 84 y.o. female,Established patient, here for evaluation of the following chief complaint(s):  Tinnitus (Patient would like to discuss tinnitus concerns, patients daughter called in ) and Follow-up (Patient is going through a lot of changes. Paranoia, insomnia per daughters phone call from 24, see notes from telephone encounter)      Digna Encarnacion is a 84 y.o. female presents for a follow up with complaints of ringing in her ears.         Review of Systems   Constitutional:  Negative for activity change, appetite change, fatigue and fever.   HENT:  Positive for tinnitus. Negative for congestion, postnasal drip, rhinorrhea, sinus pressure, sinus pain, sneezing and sore throat.    Respiratory:  Negative for cough, chest tightness, shortness of breath and wheezing.    Cardiovascular:  Negative for chest pain, palpitations and leg swelling.   Gastrointestinal:  Negative for abdominal distention, abdominal pain, anal bleeding, blood in stool, constipation, diarrhea, nausea and vomiting.   Endocrine: Negative for cold intolerance, heat intolerance, polydipsia, polyphagia and polyuria.   Genitourinary:  Negative for dyspareunia, genital sores, hematuria, menstrual problem, pelvic pain, vaginal bleeding, vaginal discharge and vaginal pain.   Musculoskeletal:  Negative for arthralgias, back pain, gait problem, joint swelling and neck pain.   Skin:  Negative for pallor, rash and wound.

## 2024-06-01 ASSESSMENT — ENCOUNTER SYMPTOMS
WHEEZING: 0
COUGH: 0
ABDOMINAL DISTENTION: 0
RHINORRHEA: 0
BACK PAIN: 0
VOMITING: 0
CHEST TIGHTNESS: 0
SHORTNESS OF BREATH: 0
BLOOD IN STOOL: 0
SORE THROAT: 0
DIARRHEA: 0
ANAL BLEEDING: 0
CONSTIPATION: 0
SINUS PAIN: 0
NAUSEA: 0
ABDOMINAL PAIN: 0
SINUS PRESSURE: 0

## 2024-07-02 ENCOUNTER — OFFICE VISIT (OUTPATIENT)
Age: 84
End: 2024-07-02
Payer: MEDICARE

## 2024-07-02 VITALS
DIASTOLIC BLOOD PRESSURE: 67 MMHG | HEART RATE: 66 BPM | TEMPERATURE: 98.3 F | WEIGHT: 106.8 LBS | RESPIRATION RATE: 17 BRPM | BODY MASS INDEX: 17.79 KG/M2 | HEIGHT: 65 IN | OXYGEN SATURATION: 98 % | SYSTOLIC BLOOD PRESSURE: 142 MMHG

## 2024-07-02 DIAGNOSIS — R30.0 DYSURIA: ICD-10-CM

## 2024-07-02 DIAGNOSIS — R45.89 SAD MOOD: Primary | ICD-10-CM

## 2024-07-02 LAB
BILIRUBIN, URINE, POC: NORMAL
BLOOD URINE, POC: NORMAL
GLUCOSE URINE, POC: NEGATIVE
KETONES, URINE, POC: NEGATIVE
LEUKOCYTE ESTERASE, URINE, POC: NEGATIVE
NITRITE, URINE, POC: NORMAL
PH, URINE, POC: 5 (ref 4.6–8)
PROTEIN,URINE, POC: NORMAL
SPECIFIC GRAVITY, URINE, POC: 1.03 (ref 1–1.03)
URINALYSIS CLARITY, POC: NORMAL
URINALYSIS COLOR, POC: YELLOW
UROBILINOGEN, POC: NORMAL

## 2024-07-02 PROCEDURE — G8419 CALC BMI OUT NRM PARAM NOF/U: HCPCS | Performed by: FAMILY MEDICINE

## 2024-07-02 PROCEDURE — 1090F PRES/ABSN URINE INCON ASSESS: CPT | Performed by: FAMILY MEDICINE

## 2024-07-02 PROCEDURE — 99213 OFFICE O/P EST LOW 20 MIN: CPT | Performed by: FAMILY MEDICINE

## 2024-07-02 PROCEDURE — 1123F ACP DISCUSS/DSCN MKR DOCD: CPT | Performed by: FAMILY MEDICINE

## 2024-07-02 PROCEDURE — G8400 PT W/DXA NO RESULTS DOC: HCPCS | Performed by: FAMILY MEDICINE

## 2024-07-02 PROCEDURE — PBSHW AMB POC URINALYSIS DIP STICK AUTO W/ MICRO: Performed by: FAMILY MEDICINE

## 2024-07-02 PROCEDURE — 1036F TOBACCO NON-USER: CPT | Performed by: FAMILY MEDICINE

## 2024-07-02 PROCEDURE — G8427 DOCREV CUR MEDS BY ELIG CLIN: HCPCS | Performed by: FAMILY MEDICINE

## 2024-07-02 PROCEDURE — 81001 URINALYSIS AUTO W/SCOPE: CPT | Performed by: FAMILY MEDICINE

## 2024-07-02 ASSESSMENT — ENCOUNTER SYMPTOMS
SINUS PRESSURE: 0
BACK PAIN: 0
NAUSEA: 0
DIARRHEA: 0
WHEEZING: 0
SHORTNESS OF BREATH: 0
COUGH: 0
ABDOMINAL PAIN: 0
BLOOD IN STOOL: 0
ABDOMINAL DISTENTION: 0
RHINORRHEA: 0
SINUS PAIN: 0
SORE THROAT: 0
ANAL BLEEDING: 0
VOMITING: 0
CONSTIPATION: 0
CHEST TIGHTNESS: 0

## 2024-07-02 NOTE — PROGRESS NOTES
Identified pt with two pt identifiers(name and ).    Chief Complaint   Patient presents with    Follow-up     Patient is here with  and  has concerns about depression/anger and would like to discuss         Health Maintenance Due   Topic    COVID-19 Vaccine (1)    DTaP/Tdap/Td vaccine (1 - Tdap)    Shingles vaccine (1 of 2)    DEXA (modify frequency per FRAX score)     Respiratory Syncytial Virus (RSV) Pregnant or age 60 yrs+ (1 - 1-dose 60+ series)    Pneumococcal 65+ years Vaccine (1 of 1 - PCV)       Wt Readings from Last 3 Encounters:   24 48.6 kg (107 lb 3.2 oz)   11/15/23 48.3 kg (106 lb 6.4 oz)   10/25/22 44.5 kg (98 lb 3.2 oz)     Temp Readings from Last 3 Encounters:   24 98 °F (36.7 °C) (Temporal)   11/15/23 98.2 °F (36.8 °C) (Temporal)     BP Readings from Last 3 Encounters:   24 (!) 142/89   11/15/23 (!) 143/80   10/25/22 (!) 143/82     Pulse Readings from Last 3 Encounters:   24 95   11/15/23 86   10/25/22 77           Depression Screening:  :         2024     3:46 PM 11/15/2023    11:26 AM 10/25/2022     1:00 PM 2022    10:00 AM 2022     1:00 PM   PHQ-9 Questionaire   Little interest or pleasure in doing things 0 0 0 0 0   Feeling down, depressed, or hopeless 0 0 0 0 0   Trouble falling or staying asleep, or sleeping too much 0       Feeling tired or having little energy 0       Poor appetite or overeating 1       Feeling bad about yourself - or that you are a failure or have let yourself or your family down 0       Trouble concentrating on things, such as reading the newspaper or watching television 0       Moving or speaking so slowly that other people could have noticed. Or the opposite - being so fidgety or restless that you have been moving around a lot more than usual 0       Thoughts that you would be better off dead, or of hurting yourself in some way 0       PHQ-9 Total Score 1 0 0 0 0   If you checked off any problems, how difficult have

## 2024-07-02 NOTE — PROGRESS NOTES
Assessment & Plan   1. Sad mood  2. Dysuria  -     AMB POC URINALYSIS DIP STICK AUTO W/ MICRO     Patient has been more tearful, during the day. Gets angry quickly, according to . They have been  for over 65 years, are happy and dependant on each other. Has a daughter who lives near by.     We discussed drinking more fluids, taking a b12 supplement and taking vitamin D.     No follow-ups on file.       Subjective   Digna Encarnacion (:  1940) is a 84 y.o. female,Established patient, here for evaluation of the following chief complaint(s):  Follow-up (Patient is here with  and  has concerns about depression/anger and would like to discuss possible medications as he has noticed her randomly crying )      Digna Encarnacion is a 84 y.o. female presents for a follow up.     Patients mood has been depressed for the past few weeks noted to being more tearful.      notices that she is more tearful during the day.         Review of Systems   Constitutional:  Negative for activity change, appetite change, fatigue and fever.   HENT:  Negative for congestion, postnasal drip, rhinorrhea, sinus pressure, sinus pain, sneezing and sore throat.    Respiratory:  Negative for cough, chest tightness, shortness of breath and wheezing.    Cardiovascular:  Negative for chest pain, palpitations and leg swelling.   Gastrointestinal:  Negative for abdominal distention, abdominal pain, anal bleeding, blood in stool, constipation, diarrhea, nausea and vomiting.   Endocrine: Negative for cold intolerance, heat intolerance, polydipsia, polyphagia and polyuria.   Genitourinary:  Negative for dyspareunia, genital sores, hematuria, menstrual problem, pelvic pain, vaginal bleeding, vaginal discharge and vaginal pain.   Musculoskeletal:  Negative for arthralgias, back pain, gait problem, joint swelling and neck pain.   Skin:  Negative for pallor, rash and wound.   Allergic/Immunologic: Negative for

## 2024-07-18 ENCOUNTER — TELEPHONE (OUTPATIENT)
Age: 84
End: 2024-07-18

## 2024-07-18 NOTE — TELEPHONE ENCOUNTER
Pt  called and said that he would like to speak with Dr. Bolivar BROWN because the pt is having a dementia episode where she cannot stop crying for the past 3 days now so he would like to discuss with Dr. Lutz.

## 2024-07-19 ENCOUNTER — TELEPHONE (OUTPATIENT)
Age: 84
End: 2024-07-19

## 2024-07-19 DIAGNOSIS — F32.9 REACTIVE DEPRESSION: Primary | ICD-10-CM

## 2024-07-19 RX ORDER — MIRTAZAPINE 15 MG/1
15 TABLET, FILM COATED ORAL NIGHTLY
Qty: 30 TABLET | Refills: 0 | Status: SHIPPED | OUTPATIENT
Start: 2024-07-19

## 2024-07-22 ENCOUNTER — TELEPHONE (OUTPATIENT)
Age: 84
End: 2024-07-22

## 2024-07-22 RX ORDER — AMLODIPINE BESYLATE 2.5 MG/1
2.5 TABLET ORAL DAILY
Qty: 120 TABLET | Refills: 0 | Status: SHIPPED | OUTPATIENT
Start: 2024-07-22

## 2024-07-26 ENCOUNTER — TELEPHONE (OUTPATIENT)
Age: 84
End: 2024-07-26

## 2024-07-26 NOTE — TELEPHONE ENCOUNTER
Pts  called in saying that the mirtazapine (REMERON) 15 MG tablet does not seem to be working. He would like to Dr. Lutz when she comes back in the office.

## 2024-07-30 ENCOUNTER — TELEMEDICINE (OUTPATIENT)
Age: 84
End: 2024-07-30
Payer: MEDICARE

## 2024-07-30 DIAGNOSIS — R40.4 TRANSIENT ALTERATION OF AWARENESS: ICD-10-CM

## 2024-07-30 DIAGNOSIS — R41.89 MODERATE COGNITIVE IMPAIRMENT: Primary | ICD-10-CM

## 2024-07-30 DIAGNOSIS — F44.89 OTHER DISSOCIATIVE AND CONVERSION DISORDERS: ICD-10-CM

## 2024-07-30 DIAGNOSIS — F32.9 REACTIVE DEPRESSION: ICD-10-CM

## 2024-07-30 DIAGNOSIS — I95.1 ORTHOSTATIC HYPOTENSION: ICD-10-CM

## 2024-07-30 PROCEDURE — G8427 DOCREV CUR MEDS BY ELIG CLIN: HCPCS | Performed by: FAMILY MEDICINE

## 2024-07-30 PROCEDURE — 1123F ACP DISCUSS/DSCN MKR DOCD: CPT | Performed by: FAMILY MEDICINE

## 2024-07-30 PROCEDURE — G8400 PT W/DXA NO RESULTS DOC: HCPCS | Performed by: FAMILY MEDICINE

## 2024-07-30 PROCEDURE — 1090F PRES/ABSN URINE INCON ASSESS: CPT | Performed by: FAMILY MEDICINE

## 2024-07-30 PROCEDURE — 99213 OFFICE O/P EST LOW 20 MIN: CPT | Performed by: FAMILY MEDICINE

## 2024-07-30 PROCEDURE — G8419 CALC BMI OUT NRM PARAM NOF/U: HCPCS | Performed by: FAMILY MEDICINE

## 2024-07-30 PROCEDURE — 1036F TOBACCO NON-USER: CPT | Performed by: FAMILY MEDICINE

## 2024-07-30 ASSESSMENT — ENCOUNTER SYMPTOMS
SINUS PAIN: 0
SORE THROAT: 0
ANAL BLEEDING: 0
WHEEZING: 0
NAUSEA: 0
SHORTNESS OF BREATH: 0
SINUS PRESSURE: 0
ABDOMINAL DISTENTION: 0
RHINORRHEA: 0
COUGH: 0
BACK PAIN: 0
CONSTIPATION: 0
VOMITING: 0
ABDOMINAL PAIN: 0
CHEST TIGHTNESS: 0
DIARRHEA: 0
BLOOD IN STOOL: 0

## 2024-07-30 NOTE — PROGRESS NOTES
Digna Encarnacion is a 84 y.o. female evaluated via audio-only technology on 2024 for Follow-up (Patient is still having a lot of emotional crying spells, medication sent is not helping )  .      Assessment & Plan:   Moderate cognitive impairment  -     CT HEAD WO CONTRAST; Future  -     Comprehensive Metabolic Panel; Future  Reactive depression  Orthostatic hypotension  Other dissociative and conversion disorders  -     CT HEAD WO CONTRAST; Future  -     Comprehensive Metabolic Panel; Future  Transient alteration of awareness    No follow-ups on file.     Subjective:     Spoke with patients  who says the patient has been crying more, complaining that her ears hurt, is feeling more insecure, and thinks she has been more paranoid.     She has had an episode of wandering on the the streets and trying to go to her daughters house.     He understands that there are several factors that could be causing her symptoms, like pain, constipation, poor sleep, and worsening of dementia.     Prior to Admission medications    Medication Sig Start Date End Date Taking? Authorizing Provider   amLODIPine (NORVASC) 2.5 MG tablet TAKE 1 TABLET BY MOUTH ONE TIME DAILY 24  Yes Génesis Lutz MD   mirtazapine (REMERON) 15 MG tablet Take 1 tablet by mouth nightly 24  Yes Génesis Lutz MD   cetirizine (ZYRTEC) 10 MG tablet Take 1 tablet by mouth daily 24  Yes Génesis Lutz MD   meloxicam (MOBIC) 7.5 MG tablet Take 1 tablet by mouth daily 24  Yes Génesis Lutz MD     Allergies   Allergen Reactions    Sulfa Antibiotics Hives    Penicillins Hives     Past Medical History:   Diagnosis Date    Autoimmune disease (HCC)     Hypertension      Past Surgical History:   Procedure Laterality Date    BREAST LUMPECTOMY      breast cancer     SECTION      HYSTERECTOMY (CERVIX STATUS UNKNOWN)      for fibroids    TONSILLECTOMY       Social History     Tobacco Use    Smoking status: Never    Smokeless tobacco:

## 2024-07-30 NOTE — PROGRESS NOTES
Identified pt with two pt identifiers(name and ).    Chief Complaint   Patient presents with    Follow-up     Patient is still having a lot of emotional crying spells, medication sent is not helping         Health Maintenance Due   Topic    COVID-19 Vaccine (1)    DTaP/Tdap/Td vaccine (1 - Tdap)    Shingles vaccine (1 of 2)    DEXA (modify frequency per FRAX score)     Respiratory Syncytial Virus (RSV) Pregnant or age 60 yrs+ (1 - 1-dose 60+ series)    Pneumococcal 65+ years Vaccine (1 of 1 - PCV)       Wt Readings from Last 3 Encounters:   24 48.4 kg (106 lb 12.8 oz)   24 48.6 kg (107 lb 3.2 oz)   11/15/23 48.3 kg (106 lb 6.4 oz)     Temp Readings from Last 3 Encounters:   24 98.3 °F (36.8 °C) (Temporal)   24 98 °F (36.7 °C) (Temporal)   11/15/23 98.2 °F (36.8 °C) (Temporal)     BP Readings from Last 3 Encounters:   24 (!) 142/67   24 (!) 142/89   11/15/23 (!) 143/80     Pulse Readings from Last 3 Encounters:   24 66   24 95   11/15/23 86           Depression Screening:  :         2024     3:46 PM 11/15/2023    11:26 AM 10/25/2022     1:00 PM 2022    10:00 AM 2022     1:00 PM   PHQ-9 Questionaire   Little interest or pleasure in doing things 0 0 0 0 0   Feeling down, depressed, or hopeless 0 0 0 0 0   Trouble falling or staying asleep, or sleeping too much 0       Feeling tired or having little energy 0       Poor appetite or overeating 1       Feeling bad about yourself - or that you are a failure or have let yourself or your family down 0       Trouble concentrating on things, such as reading the newspaper or watching television 0       Moving or speaking so slowly that other people could have noticed. Or the opposite - being so fidgety or restless that you have been moving around a lot more than usual 0       Thoughts that you would be better off dead, or of hurting yourself in some way 0       PHQ-9 Total Score 1 0 0 0 0   If you checked off any

## 2024-08-02 ENCOUNTER — TELEPHONE (OUTPATIENT)
Age: 84
End: 2024-08-02

## 2024-08-02 DIAGNOSIS — F31.11 BIPOLAR AFFECTIVE DISORDER, CURRENTLY MANIC, MILD (HCC): Primary | ICD-10-CM

## 2024-08-02 DIAGNOSIS — F30.10 MANIC BEHAVIOR (HCC): ICD-10-CM

## 2024-08-02 RX ORDER — DIVALPROEX SODIUM 125 MG/1
125 TABLET, DELAYED RELEASE ORAL 2 TIMES DAILY PRN
Qty: 60 TABLET | Refills: 0 | Status: SHIPPED | OUTPATIENT
Start: 2024-08-02

## 2024-08-02 NOTE — TELEPHONE ENCOUNTER
Patients daughter called asking about some medication that was supposed to be getting sent in and if Dr Lutz has called the patients . The patient is very  agitated

## 2024-08-02 NOTE — TELEPHONE ENCOUNTER
I spoke to patients daughter, discussed side effects of mood stabilizer.     Will try it for a month to see if she tolerates it.    Génesis Lutz MD     No future appointments.

## 2024-08-05 RX ORDER — MELOXICAM 7.5 MG/1
7.5 TABLET ORAL DAILY
Qty: 90 TABLET | Refills: 0 | OUTPATIENT
Start: 2024-08-05

## 2024-08-09 ENCOUNTER — TELEPHONE (OUTPATIENT)
Age: 84
End: 2024-08-09

## 2024-08-09 DIAGNOSIS — F32.9 REACTIVE DEPRESSION: ICD-10-CM

## 2024-08-09 DIAGNOSIS — F44.89 OTHER DISSOCIATIVE AND CONVERSION DISORDERS: Primary | ICD-10-CM

## 2024-08-09 DIAGNOSIS — R41.89 MODERATE COGNITIVE IMPAIRMENT: ICD-10-CM

## 2024-08-09 RX ORDER — MIRTAZAPINE 15 MG/1
15 TABLET, FILM COATED ORAL NIGHTLY
Qty: 30 TABLET | Refills: 0 | OUTPATIENT
Start: 2024-08-09

## 2024-08-09 NOTE — TELEPHONE ENCOUNTER
8/9/2024  1:34 PM      I've spoken with the patients daughter about the extent of my practice, and how I am only able to prescribe depakote as a mood stabilizer to help her with her excessive crying spells, wandering behaviors and her paranoia.     She understands the risks of the use of these medications, which includes early death and cardiac issues, along with several metabolic issues.     I have consulted neuropsych, Dr. Franco to further look into any other as needed medications, in the event the patient has wandering episodes.     Génesis Lutz MD   No future appointments.

## 2024-08-13 ENCOUNTER — TELEPHONE (OUTPATIENT)
Age: 84
End: 2024-08-13

## 2024-08-13 NOTE — TELEPHONE ENCOUNTER
8/13/2024  9:35 AM    Spoke to patients  about her symptoms: she is doing better.     Mirtazepine will run out shortly- advised him that this is okay.     They have a follow up with geriatrics at the end of the month.     Génesis Lutz MD

## 2024-08-22 RX ORDER — MELOXICAM 7.5 MG/1
7.5 TABLET ORAL DAILY
Qty: 90 TABLET | Refills: 1 | Status: SHIPPED | OUTPATIENT
Start: 2024-08-22

## 2024-08-22 NOTE — TELEPHONE ENCOUNTER
Pts psychiatrist is having pt do a depicoat level lab tomorrow. She will also get the CMP done Dr. Lutz put in the sytem.      Refill request for meloxicam (MOBIC) 7.5 MG tablet     [id number] [place], [place] - [address] [place] [address] -  [phone] - F [phone] [[id number]]

## 2024-12-12 ENCOUNTER — APPOINTMENT (OUTPATIENT)
Facility: HOSPITAL | Age: 84
End: 2024-12-12
Attending: EMERGENCY MEDICINE
Payer: MEDICARE

## 2024-12-12 ENCOUNTER — HOSPITAL ENCOUNTER (EMERGENCY)
Facility: HOSPITAL | Age: 84
Discharge: HOME | End: 2024-12-12
Attending: EMERGENCY MEDICINE
Payer: MEDICARE

## 2024-12-12 VITALS
OXYGEN SATURATION: 98 % | RESPIRATION RATE: 16 BRPM | SYSTOLIC BLOOD PRESSURE: 162 MMHG | DIASTOLIC BLOOD PRESSURE: 82 MMHG | TEMPERATURE: 97.9 F | HEART RATE: 95 BPM

## 2024-12-12 DIAGNOSIS — N30.00 ACUTE CYSTITIS WITHOUT HEMATURIA: ICD-10-CM

## 2024-12-12 DIAGNOSIS — R53.1 GENERALIZED WEAKNESS: Primary | ICD-10-CM

## 2024-12-12 LAB
ALBUMIN SERPL-MCNC: 3.2 G/DL (ref 3.5–5)
ALBUMIN/GLOB SERPL: 0.8 (ref 1.1–2.2)
ALP SERPL-CCNC: 103 U/L (ref 45–117)
ALT SERPL-CCNC: 17 U/L (ref 12–78)
ANION GAP SERPL CALC-SCNC: 6 MMOL/L (ref 2–12)
APPEARANCE UR: CLEAR
AST SERPL-CCNC: 19 U/L (ref 15–37)
BACTERIA URNS QL MICRO: ABNORMAL /HPF
BASOPHILS # BLD: 0 K/UL (ref 0–0.1)
BASOPHILS NFR BLD: 1 % (ref 0–1)
BILIRUB SERPL-MCNC: 0.4 MG/DL (ref 0.2–1)
BILIRUB UR QL: NEGATIVE
BUN SERPL-MCNC: 26 MG/DL (ref 6–20)
BUN/CREAT SERPL: 31 (ref 12–20)
CALCIUM SERPL-MCNC: 9.5 MG/DL (ref 8.5–10.1)
CHLORIDE SERPL-SCNC: 110 MMOL/L (ref 97–108)
CO2 SERPL-SCNC: 26 MMOL/L (ref 21–32)
COLOR UR: ABNORMAL
CREAT SERPL-MCNC: 0.84 MG/DL (ref 0.55–1.02)
DIFFERENTIAL METHOD BLD: ABNORMAL
EOSINOPHIL # BLD: 0.3 K/UL (ref 0–0.4)
EOSINOPHIL NFR BLD: 5 % (ref 0–7)
EPITH CASTS URNS QL MICRO: ABNORMAL /LPF
ERYTHROCYTE [DISTWIDTH] IN BLOOD BY AUTOMATED COUNT: 11.9 % (ref 11.5–14.5)
GLOBULIN SER CALC-MCNC: 4.2 G/DL (ref 2–4)
GLUCOSE SERPL-MCNC: 122 MG/DL (ref 65–100)
GLUCOSE UR STRIP.AUTO-MCNC: NEGATIVE MG/DL
HCT VFR BLD AUTO: 36.5 % (ref 35–47)
HGB BLD-MCNC: 12.2 G/DL (ref 11.5–16)
HGB UR QL STRIP: ABNORMAL
IMM GRANULOCYTES # BLD AUTO: 0 K/UL (ref 0–0.04)
IMM GRANULOCYTES NFR BLD AUTO: 0 % (ref 0–0.5)
KETONES UR QL STRIP.AUTO: NEGATIVE MG/DL
LACTATE SERPL-SCNC: 0.8 MMOL/L (ref 0.4–2)
LEUKOCYTE ESTERASE UR QL STRIP.AUTO: NEGATIVE
LYMPHOCYTES # BLD: 3.4 K/UL (ref 0.8–3.5)
LYMPHOCYTES NFR BLD: 52 % (ref 12–49)
MCH RBC QN AUTO: 32.6 PG (ref 26–34)
MCHC RBC AUTO-ENTMCNC: 33.4 G/DL (ref 30–36.5)
MCV RBC AUTO: 97.6 FL (ref 80–99)
MONOCYTES # BLD: 0.4 K/UL (ref 0–1)
MONOCYTES NFR BLD: 7 % (ref 5–13)
NEUTS SEG # BLD: 2.3 K/UL (ref 1.8–8)
NEUTS SEG NFR BLD: 35 % (ref 32–75)
NITRITE UR QL STRIP.AUTO: POSITIVE
NRBC # BLD: 0 K/UL (ref 0–0.01)
NRBC BLD-RTO: 0 PER 100 WBC
PH UR STRIP: 5.5 (ref 5–8)
PLATELET # BLD AUTO: 269 K/UL (ref 150–400)
PMV BLD AUTO: 9.3 FL (ref 8.9–12.9)
POTASSIUM SERPL-SCNC: 4.2 MMOL/L (ref 3.5–5.1)
PROT SERPL-MCNC: 7.4 G/DL (ref 6.4–8.2)
PROT UR STRIP-MCNC: NEGATIVE MG/DL
RBC # BLD AUTO: 3.74 M/UL (ref 3.8–5.2)
RBC #/AREA URNS HPF: ABNORMAL /HPF (ref 0–5)
SODIUM SERPL-SCNC: 142 MMOL/L (ref 136–145)
SP GR UR REFRACTOMETRY: 1.02 (ref 1–1.03)
SPECIMEN HOLD: NORMAL
TROPONIN I SERPL HS-MCNC: <4 NG/L (ref 0–51)
UROBILINOGEN UR QL STRIP.AUTO: 0.2 EU/DL (ref 0.2–1)
WBC # BLD AUTO: 6.4 K/UL (ref 3.6–11)
WBC URNS QL MICRO: ABNORMAL /HPF (ref 0–4)

## 2024-12-12 PROCEDURE — 2580000003 HC RX 258: Performed by: EMERGENCY MEDICINE

## 2024-12-12 PROCEDURE — 85025 COMPLETE CBC W/AUTO DIFF WBC: CPT

## 2024-12-12 PROCEDURE — 71045 X-RAY EXAM CHEST 1 VIEW: CPT

## 2024-12-12 PROCEDURE — 87086 URINE CULTURE/COLONY COUNT: CPT

## 2024-12-12 PROCEDURE — 83605 ASSAY OF LACTIC ACID: CPT

## 2024-12-12 PROCEDURE — 84484 ASSAY OF TROPONIN QUANT: CPT

## 2024-12-12 PROCEDURE — 80053 COMPREHEN METABOLIC PANEL: CPT

## 2024-12-12 PROCEDURE — 80164 ASSAY DIPROPYLACETIC ACD TOT: CPT

## 2024-12-12 PROCEDURE — 99285 EMERGENCY DEPT VISIT HI MDM: CPT

## 2024-12-12 PROCEDURE — 81001 URINALYSIS AUTO W/SCOPE: CPT

## 2024-12-12 PROCEDURE — 36415 COLL VENOUS BLD VENIPUNCTURE: CPT

## 2024-12-12 PROCEDURE — 6370000000 HC RX 637 (ALT 250 FOR IP): Performed by: EMERGENCY MEDICINE

## 2024-12-12 RX ORDER — 0.9 % SODIUM CHLORIDE 0.9 %
1000 INTRAVENOUS SOLUTION INTRAVENOUS ONCE
Status: COMPLETED | OUTPATIENT
Start: 2024-12-12 | End: 2024-12-12

## 2024-12-12 RX ORDER — CEPHALEXIN 250 MG/5ML
250 POWDER, FOR SUSPENSION ORAL 2 TIMES DAILY
Qty: 70 ML | Refills: 0 | Status: SHIPPED | OUTPATIENT
Start: 2024-12-12 | End: 2024-12-19

## 2024-12-12 RX ORDER — CEPHALEXIN 250 MG/5ML
250 POWDER, FOR SUSPENSION ORAL 2 TIMES DAILY
Qty: 70 ML | Refills: 0 | Status: SHIPPED
Start: 2024-12-12 | End: 2024-12-12

## 2024-12-12 RX ORDER — CEPHALEXIN 125 MG/5ML
250 POWDER, FOR SUSPENSION ORAL ONCE
Status: COMPLETED | OUTPATIENT
Start: 2024-12-12 | End: 2024-12-12

## 2024-12-12 RX ADMIN — SODIUM CHLORIDE 1000 ML: 9 INJECTION, SOLUTION INTRAVENOUS at 20:48

## 2024-12-12 RX ADMIN — CEPHALEXIN 250 MG: 125 FOR SUSPENSION ORAL at 22:16

## 2024-12-12 ASSESSMENT — PAIN - FUNCTIONAL ASSESSMENT: PAIN_FUNCTIONAL_ASSESSMENT: NONE - DENIES PAIN

## 2024-12-13 LAB
EKG ATRIAL RATE: 93 BPM
EKG DIAGNOSIS: NORMAL
EKG P AXIS: 31 DEGREES
EKG P-R INTERVAL: 126 MS
EKG Q-T INTERVAL: 368 MS
EKG QRS DURATION: 86 MS
EKG QTC CALCULATION (BAZETT): 457 MS
EKG R AXIS: 4 DEGREES
EKG T AXIS: 60 DEGREES
EKG VENTRICULAR RATE: 93 BPM
VALPROATE SERPL-MCNC: 22 UG/ML (ref 50–100)

## 2024-12-13 NOTE — ED TRIAGE NOTES
Pt arrives to ED via POV ambulatory with daughter who reports malodorous urine, urinating less frequently, lack of appetite, and increased fatigue starting yesterday. Daughter suspects possible UTI. Hx expressive aphasia and alzheimer's at baseline. Provider in triage.

## 2024-12-13 NOTE — ED NOTES
Pt was discharged by Dr Lamar Garcia  Pt verbalized good understanding of all discharge instructions, prescriptions, and follow up care.   All questions answered.  Pt in stable condition on discharge with family

## 2024-12-13 NOTE — ED PROVIDER NOTES
[facility] EMERGENCY DEPT  EMERGENCY DEPARTMENT ENCOUNTER      Pt Name: Digna Encarnacion  MRN: [id number]  Birthdate 1940  Date of evaluation: 2024  Provider: Cesar Garcia MD    CHIEF COMPLAINT       Chief Complaint   Patient presents with    Urinary Hesitancy    Fatigue         HISTORY OF PRESENT ILLNESS   (Location/Symptom, Timing/Onset, Context/Setting, Quality, Duration, Modifying Factors, Severity)  Note limiting factors.   84-year-old with a history of hypertension, hyperlipidemia, expressive aphasia, Alzheimer's dementia, orthostatic hypotension, breast cancer.  She presents accompanied by her daughter (who provides the history).  Her daughter reports that she first noted malodorous urine yesterday.  Today the patient has had decreased urine output, fatigue, decreased appetite.  Her daughter states that she normally takes a 2-hour nap in the afternoon.  Today she slept for 6 hours and did not want to get out of bed.  She has not eaten much today.  The patient denies complaints.          Review of External Medical Records:     Nursing Notes were reviewed.    REVIEW OF SYSTEMS    (2-9 systems for level 4, 10 or more for level 5)     Review of Systems    Except as noted above the remainder of the review of systems was reviewed and negative.       PAST MEDICAL HISTORY     Past Medical History:   Diagnosis Date    Autoimmune disease (HCC)     Hypertension          SURGICAL HISTORY       Past Surgical History:   Procedure Laterality Date    BREAST LUMPECTOMY      breast cancer     SECTION      HYSTERECTOMY (CERVIX STATUS UNKNOWN)      for fibroids    TONSILLECTOMY           CURRENT MEDICATIONS       Previous Medications    AMLODIPINE (NORVASC) 2.5 MG TABLET    Take 1-2 tablets by mouth daily    CETIRIZINE (ZYRTEC) 10 MG TABLET    Take 1 tablet by mouth daily    DIVALPROEX (DEPAKOTE) 125 MG DR TABLET    Take 1 tablet by mouth 2 times daily as needed (start taking it at night)    MELOXICAM (MOBIC) 7.5

## 2024-12-15 LAB
BACTERIA SPEC CULT: ABNORMAL
CC UR VC: ABNORMAL
SERVICE CMNT-IMP: ABNORMAL

## 2024-12-16 LAB
EKG ATRIAL RATE: 93 BPM
EKG DIAGNOSIS: NORMAL
EKG P AXIS: 31 DEGREES
EKG P-R INTERVAL: 126 MS
EKG Q-T INTERVAL: 368 MS
EKG QRS DURATION: 86 MS
EKG QTC CALCULATION (BAZETT): 457 MS
EKG R AXIS: 4 DEGREES
EKG T AXIS: 60 DEGREES
EKG VENTRICULAR RATE: 93 BPM

## 2024-12-24 DIAGNOSIS — I10 PRIMARY HYPERTENSION: ICD-10-CM

## 2024-12-24 RX ORDER — AMLODIPINE BESYLATE 2.5 MG/1
2.5-5 TABLET ORAL DAILY
Qty: 120 TABLET | Refills: 0 | Status: SHIPPED | OUTPATIENT
Start: 2024-12-24

## 2024-12-27 DIAGNOSIS — I10 PRIMARY HYPERTENSION: ICD-10-CM

## 2024-12-27 RX ORDER — AMLODIPINE BESYLATE 2.5 MG/1
2.5-5 TABLET ORAL DAILY
Qty: 120 TABLET | Refills: 0 | OUTPATIENT
Start: 2024-12-27

## 2025-01-15 ENCOUNTER — TELEPHONE (OUTPATIENT)
Age: 85
End: 2025-01-15

## 2025-01-15 NOTE — TELEPHONE ENCOUNTER
Pt's daughter and  called stating that the Pt's health was declining and that they are requesting a referral for hospice. Please advise.

## 2025-01-17 ENCOUNTER — TELEMEDICINE (OUTPATIENT)
Age: 85
End: 2025-01-17
Payer: MEDICARE

## 2025-01-17 DIAGNOSIS — E43 SEVERE PROTEIN-CALORIE MALNUTRITION (HCC): ICD-10-CM

## 2025-01-17 DIAGNOSIS — G30.0 SEVERE EARLY ONSET ALZHEIMER'S DEMENTIA WITH OTHER BEHAVIORAL DISTURBANCE (HCC): Primary | ICD-10-CM

## 2025-01-17 DIAGNOSIS — C50.511 MALIGNANT NEOPLASM OF LOWER-OUTER QUADRANT OF RIGHT BREAST OF FEMALE, ESTROGEN RECEPTOR POSITIVE (HCC): ICD-10-CM

## 2025-01-17 DIAGNOSIS — Z17.0 MALIGNANT NEOPLASM OF LOWER-OUTER QUADRANT OF RIGHT BREAST OF FEMALE, ESTROGEN RECEPTOR POSITIVE (HCC): ICD-10-CM

## 2025-01-17 DIAGNOSIS — F02.C18 SEVERE EARLY ONSET ALZHEIMER'S DEMENTIA WITH OTHER BEHAVIORAL DISTURBANCE (HCC): Primary | ICD-10-CM

## 2025-01-17 DIAGNOSIS — M62.84 SARCOPENIA: ICD-10-CM

## 2025-01-17 PROBLEM — G30.9 DAT (DEMENTIA OF ALZHEIMER TYPE) (HCC): Status: ACTIVE | Noted: 2025-01-17

## 2025-01-17 PROBLEM — F02.80 DAT (DEMENTIA OF ALZHEIMER TYPE) (HCC): Status: ACTIVE | Noted: 2025-01-17

## 2025-01-17 PROCEDURE — 99214 OFFICE O/P EST MOD 30 MIN: CPT | Performed by: FAMILY MEDICINE

## 2025-01-17 PROCEDURE — 1123F ACP DISCUSS/DSCN MKR DOCD: CPT | Performed by: FAMILY MEDICINE

## 2025-01-17 PROCEDURE — 1090F PRES/ABSN URINE INCON ASSESS: CPT | Performed by: FAMILY MEDICINE

## 2025-01-17 PROCEDURE — G8419 CALC BMI OUT NRM PARAM NOF/U: HCPCS | Performed by: FAMILY MEDICINE

## 2025-01-17 PROCEDURE — G8400 PT W/DXA NO RESULTS DOC: HCPCS | Performed by: FAMILY MEDICINE

## 2025-01-17 PROCEDURE — 1160F RVW MEDS BY RX/DR IN RCRD: CPT | Performed by: FAMILY MEDICINE

## 2025-01-17 PROCEDURE — 1036F TOBACCO NON-USER: CPT | Performed by: FAMILY MEDICINE

## 2025-01-17 PROCEDURE — 1159F MED LIST DOCD IN RCRD: CPT | Performed by: FAMILY MEDICINE

## 2025-01-17 PROCEDURE — G8427 DOCREV CUR MEDS BY ELIG CLIN: HCPCS | Performed by: FAMILY MEDICINE

## 2025-01-17 ASSESSMENT — ENCOUNTER SYMPTOMS
NAUSEA: 0
VOMITING: 0
BLOOD IN STOOL: 0
SORE THROAT: 0
RHINORRHEA: 0
SINUS PRESSURE: 0
DIARRHEA: 0
CONSTIPATION: 0
BACK PAIN: 0
ANAL BLEEDING: 0
WHEEZING: 0
SHORTNESS OF BREATH: 0
ABDOMINAL PAIN: 0
SINUS PAIN: 0
ABDOMINAL DISTENTION: 0
CHEST TIGHTNESS: 0
COUGH: 0

## 2025-01-17 NOTE — PROGRESS NOTES
2025    Digna Encarnacion is a 84 y.o. female evaluated via audio-only technology on 2025 for Altered Mental Status  .      Assessment & Plan:   Severe early onset Alzheimer's dementia with other behavioral disturbance (HCC)  -     External Referral To Physical Therapy  -     [facility] Order for Hospital Bed as OP  Malignant neoplasm of lower-outer quadrant of right breast of female, estrogen receptor positive (HCC)  -     External Referral To Physical Therapy  Sarcopenia  Severe protein-calorie malnutrition (HCC)    No follow-ups on file.     Subjective:     Digna Encarnacion is a 84 y.o. female presents with complaints of altered mental status, which is waxing and waning. Her appetite decreased, she is grazing and picking at different foods. She has odd sleeping patterns, has outbursts, but they are pretty well controlled on depakote, levels are low normal, she is taking tylenol and advil as needed for pain. Her blood pressure has been high normal, she is tolerating amlodipine. There are no skin or nail issues.        Prior to Admission medications    Medication Sig Start Date End Date Taking? Authorizing Provider   amLODIPine (NORVASC) 2.5 MG tablet Take 1-2 tablets by mouth daily 24   Génesis Lutz MD   meloxicam (MOBIC) 7.5 MG tablet Take 1 tablet by mouth daily 24   Jason Valles MD   divalproex (DEPAKOTE) 125 MG DR tablet Take 1 tablet by mouth 2 times daily as needed (start taking it at night) 24   Génesis Lutz MD   cetirizine (ZYRTEC) 10 MG tablet Take 1 tablet by mouth daily 24   Génesis Lutz MD     Allergies   Allergen Reactions    Sulfa Antibiotics Hives    Penicillins Hives     Past Medical History:   Diagnosis Date    Autoimmune disease (HCC)     Hypertension      Past Surgical History:   Procedure Laterality Date    BREAST LUMPECTOMY      breast cancer     SECTION      HYSTERECTOMY (CERVIX STATUS UNKNOWN)      for fibroids    TONSILLECTOMY       Social History

## 2025-01-23 ENCOUNTER — TELEPHONE (OUTPATIENT)
Age: 85
End: 2025-01-23

## 2025-01-23 DIAGNOSIS — E43 SEVERE PROTEIN-CALORIE MALNUTRITION (HCC): Primary | ICD-10-CM

## 2025-01-23 DIAGNOSIS — G30.1 SEVERE LATE ONSET ALZHEIMER'S DEMENTIA WITH AGITATION (HCC): ICD-10-CM

## 2025-01-23 DIAGNOSIS — F02.C11 SEVERE LATE ONSET ALZHEIMER'S DEMENTIA WITH AGITATION (HCC): ICD-10-CM

## 2025-01-24 ENCOUNTER — TELEMEDICINE (OUTPATIENT)
Age: 85
End: 2025-01-24
Payer: MEDICARE

## 2025-01-24 DIAGNOSIS — E43 SEVERE PROTEIN-CALORIE MALNUTRITION (HCC): ICD-10-CM

## 2025-01-24 DIAGNOSIS — Z91.89 AT RISK FOR OSTEOPOROSIS: ICD-10-CM

## 2025-01-24 DIAGNOSIS — G30.1 SEVERE LATE ONSET ALZHEIMER'S DEMENTIA WITH AGITATION (HCC): Primary | ICD-10-CM

## 2025-01-24 DIAGNOSIS — C50.511 MALIGNANT NEOPLASM OF LOWER-OUTER QUADRANT OF RIGHT BREAST OF FEMALE, ESTROGEN RECEPTOR POSITIVE (HCC): ICD-10-CM

## 2025-01-24 DIAGNOSIS — Z17.0 MALIGNANT NEOPLASM OF LOWER-OUTER QUADRANT OF RIGHT BREAST OF FEMALE, ESTROGEN RECEPTOR POSITIVE (HCC): ICD-10-CM

## 2025-01-24 DIAGNOSIS — F02.C11 SEVERE LATE ONSET ALZHEIMER'S DEMENTIA WITH AGITATION (HCC): Primary | ICD-10-CM

## 2025-01-24 PROCEDURE — G8400 PT W/DXA NO RESULTS DOC: HCPCS | Performed by: FAMILY MEDICINE

## 2025-01-24 PROCEDURE — 99214 OFFICE O/P EST MOD 30 MIN: CPT | Performed by: FAMILY MEDICINE

## 2025-01-24 PROCEDURE — 1090F PRES/ABSN URINE INCON ASSESS: CPT | Performed by: FAMILY MEDICINE

## 2025-01-24 PROCEDURE — G8427 DOCREV CUR MEDS BY ELIG CLIN: HCPCS | Performed by: FAMILY MEDICINE

## 2025-01-24 PROCEDURE — 1036F TOBACCO NON-USER: CPT | Performed by: FAMILY MEDICINE

## 2025-01-24 PROCEDURE — G8419 CALC BMI OUT NRM PARAM NOF/U: HCPCS | Performed by: FAMILY MEDICINE

## 2025-01-24 PROCEDURE — 1123F ACP DISCUSS/DSCN MKR DOCD: CPT | Performed by: FAMILY MEDICINE

## 2025-01-24 RX ORDER — MIRTAZAPINE 15 MG/1
7.5 TABLET, FILM COATED ORAL
COMMUNITY
Start: 2025-01-07

## 2025-01-24 SDOH — ECONOMIC STABILITY: FOOD INSECURITY: WITHIN THE PAST 12 MONTHS, THE FOOD YOU BOUGHT JUST DIDN'T LAST AND YOU DIDN'T HAVE MONEY TO GET MORE.: NEVER TRUE

## 2025-01-24 SDOH — ECONOMIC STABILITY: FOOD INSECURITY: WITHIN THE PAST 12 MONTHS, YOU WORRIED THAT YOUR FOOD WOULD RUN OUT BEFORE YOU GOT MONEY TO BUY MORE.: NEVER TRUE

## 2025-01-24 ASSESSMENT — PATIENT HEALTH QUESTIONNAIRE - PHQ9
6. FEELING BAD ABOUT YOURSELF - OR THAT YOU ARE A FAILURE OR HAVE LET YOURSELF OR YOUR FAMILY DOWN: NOT AT ALL
SUM OF ALL RESPONSES TO PHQ QUESTIONS 1-9: 0
8. MOVING OR SPEAKING SO SLOWLY THAT OTHER PEOPLE COULD HAVE NOTICED. OR THE OPPOSITE, BEING SO FIGETY OR RESTLESS THAT YOU HAVE BEEN MOVING AROUND A LOT MORE THAN USUAL: NOT AT ALL
7. TROUBLE CONCENTRATING ON THINGS, SUCH AS READING THE NEWSPAPER OR WATCHING TELEVISION: NOT AT ALL
SUM OF ALL RESPONSES TO PHQ QUESTIONS 1-9: 0
3. TROUBLE FALLING OR STAYING ASLEEP: NOT AT ALL
1. LITTLE INTEREST OR PLEASURE IN DOING THINGS: NOT AT ALL
9. THOUGHTS THAT YOU WOULD BE BETTER OFF DEAD, OR OF HURTING YOURSELF: NOT AT ALL
4. FEELING TIRED OR HAVING LITTLE ENERGY: NOT AT ALL
10. IF YOU CHECKED OFF ANY PROBLEMS, HOW DIFFICULT HAVE THESE PROBLEMS MADE IT FOR YOU TO DO YOUR WORK, TAKE CARE OF THINGS AT HOME, OR GET ALONG WITH OTHER PEOPLE: NOT DIFFICULT AT ALL
SUM OF ALL RESPONSES TO PHQ9 QUESTIONS 1 & 2: 0
2. FEELING DOWN, DEPRESSED OR HOPELESS: NOT AT ALL
SUM OF ALL RESPONSES TO PHQ QUESTIONS 1-9: 0
SUM OF ALL RESPONSES TO PHQ QUESTIONS 1-9: 0
5. POOR APPETITE OR OVEREATING: NOT AT ALL

## 2025-01-24 NOTE — PROGRESS NOTES
Identified pt with two pt identifiers(name and )    Chief Complaint   Patient presents with    discuss hospice        Health Maintenance Due   Topic    DTaP/Tdap/Td vaccine (1 - Tdap)    Shingles vaccine (1 of 2)    DEXA (modify frequency per FRAX score)     Pneumococcal 65+ years Vaccine (1 of 1 - PCV)    Respiratory Syncytial Virus (RSV) Pregnant or age 60 yrs+ (1 - 1-dose 75+ series)    Flu vaccine (1)    COVID-19 Vaccine (1 - - season)       Wt Readings from Last 3 Encounters:   24 48.4 kg (106 lb 12.8 oz)   24 48.6 kg (107 lb 3.2 oz)   11/15/23 48.3 kg (106 lb 6.4 oz)     Temp Readings from Last 3 Encounters:   24 97.9 °F (36.6 °C) (Oral)   24 98.3 °F (36.8 °C) (Temporal)   24 98 °F (36.7 °C) (Temporal)     BP Readings from Last 3 Encounters:   24 (!) 162/82   24 (!) 142/67   24 (!) 142/89     Pulse Readings from Last 3 Encounters:   24 95   24 66   24 95           Depression Screening:  :         2025     2:10 PM 2024     3:46 PM 11/15/2023    11:26 AM 10/25/2022     1:00 PM 2022    10:00 AM 2022     1:00 PM   PHQ-9 Questionaire   Little interest or pleasure in doing things 0 0 0 0 0 0   Feeling down, depressed, or hopeless 0 0 0 0 0 0   Trouble falling or staying asleep, or sleeping too much 0 0       Feeling tired or having little energy 0 0       Poor appetite or overeating 0 1       Feeling bad about yourself - or that you are a failure or have let yourself or your family down 0 0       Trouble concentrating on things, such as reading the newspaper or watching television 0 0       Moving or speaking so slowly that other people could have noticed. Or the opposite - being so fidgety or restless that you have been moving around a lot more than usual 0 0       Thoughts that you would be better off dead, or of hurting yourself in some way 0 0       PHQ-9 Total Score 0 1 0 0 0 0   If you checked off any problems, how

## 2025-01-29 ASSESSMENT — ENCOUNTER SYMPTOMS
SINUS PAIN: 0
NAUSEA: 0
CHEST TIGHTNESS: 0
CONSTIPATION: 0
VOMITING: 0
ABDOMINAL PAIN: 0
BLOOD IN STOOL: 0
COUGH: 0
WHEEZING: 0
BACK PAIN: 0
DIARRHEA: 0
ABDOMINAL DISTENTION: 0
RHINORRHEA: 0
SORE THROAT: 0
SINUS PRESSURE: 0
ANAL BLEEDING: 0
SHORTNESS OF BREATH: 0

## 2025-01-29 NOTE — PROGRESS NOTES
Digna Encarnacion, was evaluated through a synchronous (real-time) audio-video encounter. The patient (or guardian if applicable) is aware that this is a billable service, which includes applicable co-pays. This Virtual Visit was conducted with patient's (and/or legal guardian's) consent. Patient identification was verified, and a caregiver was present when appropriate.   The patient was located at Home: [address] [place] [id number]  Provider was located at Facility (Appt Dept): [address] [place] [id number]  Confirm you are appropriately licensed, registered, or certified to deliver care in the state where the patient is located as indicated above. If you are not or unsure, please re-schedule the visit: Yes, I confirm.     Digna Encarnacion (:  1940) is a Established patient, presenting virtually for evaluation of the following:      Below is the assessment and plan developed based on review of pertinent history, physical exam, labs, studies, and medications.     Assessment & Plan  Severe late onset Alzheimer's dementia with agitation (HCC)            Severe protein-calorie malnutrition (HCC)          Malignant neoplasm of lower-outer quadrant of right breast of female, estrogen receptor positive (HCC)            At risk for osteoporosis   At risk for falls, fractures           No follow-ups on file.       Subjective   Digna Encarnacion is a 84 y.o. female presents with her daughter and , to discuss decline in health and mental state. Patient has been more agitated, is not eating, laying down in bed and sleeping more.       Review of Systems   Constitutional:  Negative for activity change, appetite change, fatigue and fever.   HENT:  Negative for congestion, postnasal drip, rhinorrhea, sinus pressure, sinus pain, sneezing and sore throat.    Respiratory:  Negative for cough, chest tightness, shortness of breath and wheezing.    Cardiovascular:  Negative for chest pain,

## 2025-02-10 NOTE — TELEPHONE ENCOUNTER
1. Severe protein-calorie malnutrition (HCC)    - [facility] by [facility]    2. Severe late onset Alzheimer's dementia with agitation (HCC)    - [facility] by [facility]

## 2025-06-06 ENCOUNTER — TELEPHONE (OUTPATIENT)
Age: 85
End: 2025-06-06